# Patient Record
Sex: FEMALE | Race: ASIAN | NOT HISPANIC OR LATINO
[De-identification: names, ages, dates, MRNs, and addresses within clinical notes are randomized per-mention and may not be internally consistent; named-entity substitution may affect disease eponyms.]

---

## 2018-07-24 ENCOUNTER — RESULT REVIEW (OUTPATIENT)
Age: 38
End: 2018-07-24

## 2020-08-12 ENCOUNTER — RESULT REVIEW (OUTPATIENT)
Age: 40
End: 2020-08-12

## 2020-08-14 ENCOUNTER — OUTPATIENT (OUTPATIENT)
Dept: OUTPATIENT SERVICES | Facility: HOSPITAL | Age: 40
LOS: 1 days | End: 2020-08-14
Payer: COMMERCIAL

## 2020-08-14 ENCOUNTER — APPOINTMENT (OUTPATIENT)
Dept: ULTRASOUND IMAGING | Facility: CLINIC | Age: 40
End: 2020-08-14
Payer: COMMERCIAL

## 2020-08-14 ENCOUNTER — TRANSCRIPTION ENCOUNTER (OUTPATIENT)
Age: 40
End: 2020-08-14

## 2020-08-14 ENCOUNTER — APPOINTMENT (OUTPATIENT)
Dept: MAMMOGRAPHY | Facility: CLINIC | Age: 40
End: 2020-08-14
Payer: COMMERCIAL

## 2020-08-14 DIAGNOSIS — R92.2 INCONCLUSIVE MAMMOGRAM: ICD-10-CM

## 2020-08-14 DIAGNOSIS — Z12.31 ENCOUNTER FOR SCREENING MAMMOGRAM FOR MALIGNANT NEOPLASM OF BREAST: ICD-10-CM

## 2020-08-14 PROCEDURE — 76641 ULTRASOUND BREAST COMPLETE: CPT | Mod: 26,50

## 2020-08-14 PROCEDURE — 76641 ULTRASOUND BREAST COMPLETE: CPT

## 2020-08-14 PROCEDURE — G0279: CPT | Mod: 26

## 2020-08-14 PROCEDURE — 77066 DX MAMMO INCL CAD BI: CPT | Mod: 26

## 2020-08-14 PROCEDURE — 77066 DX MAMMO INCL CAD BI: CPT

## 2020-08-14 PROCEDURE — G0279: CPT

## 2020-08-17 ENCOUNTER — LABORATORY RESULT (OUTPATIENT)
Age: 40
End: 2020-08-17

## 2020-08-17 ENCOUNTER — APPOINTMENT (OUTPATIENT)
Dept: SURGICAL ONCOLOGY | Facility: CLINIC | Age: 40
End: 2020-08-17
Payer: COMMERCIAL

## 2020-08-17 VITALS
SYSTOLIC BLOOD PRESSURE: 122 MMHG | HEIGHT: 63 IN | WEIGHT: 163 LBS | OXYGEN SATURATION: 98 % | BODY MASS INDEX: 28.88 KG/M2 | DIASTOLIC BLOOD PRESSURE: 81 MMHG | HEART RATE: 82 BPM

## 2020-08-17 DIAGNOSIS — N63.10 UNSPECIFIED LUMP IN THE RIGHT BREAST, UNSPECIFIED QUADRANT: ICD-10-CM

## 2020-08-17 PROCEDURE — 99205 OFFICE O/P NEW HI 60 MIN: CPT

## 2020-08-17 NOTE — ASSESSMENT
[FreeTextEntry1] : IMP:\par Eczematous inflammation of the right nipple - areolar complex\par \par Plan:\par Await biopsy results

## 2020-08-17 NOTE — PHYSICAL EXAM
[Normal] : supple, no neck mass and thyroid not enlarged [Normal Supraclavicular Lymph Nodes] : normal supraclavicular lymph nodes [Normal Axillary Lymph Nodes] : normal axillary lymph nodes [de-identified] : Mild fibrocystic changes but no masses or nipple discharge; the nipple of the right breast does not have any changes on it but approximately one third of the areola has eczematous inflammation. This was biopsied today [Normal] : oriented to person, place and time, with appropriate affect

## 2020-08-17 NOTE — CONSULT LETTER
[Dear  ___] : Dear  [unfilled], [Consult Letter:] : I had the pleasure of evaluating your patient, [unfilled]. [Consult Closing:] : Thank you very much for allowing me to participate in the care of this patient.  If you have any questions, please do not hesitate to contact me. [Please see my note below.] : Please see my note below. [Sincerely,] : Sincerely, [FreeTextEntry1] : I will keep you informed of the biopsy results. [FreeTextEntry3] : Fidencio Barakat MD FACS\par Chief of Surgical Oncology\par \par

## 2020-08-17 NOTE — HISTORY OF PRESENT ILLNESS
[de-identified] : 38 YO female with right nipple eczematous inflammation which has been present for 3 months.\par She does not have any rash anywhere else on the body. she has never had any breast masses.\par She has no family hx. of breast cancer.\par \par Mammogram and breast sonogram arcos on August 14, 2020 : negative

## 2021-02-10 ENCOUNTER — APPOINTMENT (OUTPATIENT)
Dept: HUMAN REPRODUCTION | Facility: CLINIC | Age: 41
End: 2021-02-10
Payer: COMMERCIAL

## 2021-02-10 PROCEDURE — 99205 OFFICE O/P NEW HI 60 MIN: CPT | Mod: 95

## 2021-04-29 ENCOUNTER — APPOINTMENT (OUTPATIENT)
Dept: INTERVENTIONAL RADIOLOGY/VASCULAR | Facility: CLINIC | Age: 41
End: 2021-04-29
Payer: COMMERCIAL

## 2021-04-29 ENCOUNTER — RESULT REVIEW (OUTPATIENT)
Age: 41
End: 2021-04-29

## 2021-04-29 ENCOUNTER — OUTPATIENT (OUTPATIENT)
Dept: OUTPATIENT SERVICES | Facility: HOSPITAL | Age: 41
LOS: 1 days | End: 2021-04-29

## 2021-04-29 DIAGNOSIS — N97.9 FEMALE INFERTILITY, UNSPECIFIED: ICD-10-CM

## 2021-04-29 PROCEDURE — 74740 X-RAY FEMALE GENITAL TRACT: CPT | Mod: 26

## 2021-04-29 PROCEDURE — 58340 CATHETER FOR HYSTEROGRAPHY: CPT

## 2021-07-12 ENCOUNTER — APPOINTMENT (OUTPATIENT)
Dept: HUMAN REPRODUCTION | Facility: CLINIC | Age: 41
End: 2021-07-12
Payer: COMMERCIAL

## 2021-07-12 PROCEDURE — 99215 OFFICE O/P EST HI 40 MIN: CPT | Mod: 25

## 2021-07-12 PROCEDURE — 99072 ADDL SUPL MATRL&STAF TM PHE: CPT

## 2021-07-12 PROCEDURE — 36415 COLL VENOUS BLD VENIPUNCTURE: CPT

## 2021-07-12 PROCEDURE — 76830 TRANSVAGINAL US NON-OB: CPT

## 2021-07-19 ENCOUNTER — APPOINTMENT (OUTPATIENT)
Dept: HUMAN REPRODUCTION | Facility: CLINIC | Age: 41
End: 2021-07-19
Payer: COMMERCIAL

## 2021-07-19 PROCEDURE — 76830 TRANSVAGINAL US NON-OB: CPT

## 2021-07-19 PROCEDURE — 99072 ADDL SUPL MATRL&STAF TM PHE: CPT

## 2021-07-19 PROCEDURE — 99213 OFFICE O/P EST LOW 20 MIN: CPT | Mod: 25

## 2021-07-19 PROCEDURE — 36415 COLL VENOUS BLD VENIPUNCTURE: CPT

## 2021-07-22 ENCOUNTER — APPOINTMENT (OUTPATIENT)
Dept: HUMAN REPRODUCTION | Facility: CLINIC | Age: 41
End: 2021-07-22
Payer: COMMERCIAL

## 2021-07-22 PROCEDURE — 99213 OFFICE O/P EST LOW 20 MIN: CPT | Mod: 25

## 2021-07-22 PROCEDURE — 99072 ADDL SUPL MATRL&STAF TM PHE: CPT

## 2021-07-22 PROCEDURE — 76830 TRANSVAGINAL US NON-OB: CPT

## 2021-07-22 PROCEDURE — 36415 COLL VENOUS BLD VENIPUNCTURE: CPT

## 2021-08-06 ENCOUNTER — APPOINTMENT (OUTPATIENT)
Dept: HUMAN REPRODUCTION | Facility: CLINIC | Age: 41
End: 2021-08-06
Payer: COMMERCIAL

## 2021-08-06 PROCEDURE — 76830 TRANSVAGINAL US NON-OB: CPT

## 2021-08-06 PROCEDURE — 99213 OFFICE O/P EST LOW 20 MIN: CPT | Mod: 25

## 2021-08-06 PROCEDURE — 36415 COLL VENOUS BLD VENIPUNCTURE: CPT

## 2021-08-18 ENCOUNTER — APPOINTMENT (OUTPATIENT)
Dept: HUMAN REPRODUCTION | Facility: CLINIC | Age: 41
End: 2021-08-18
Payer: COMMERCIAL

## 2021-08-18 PROCEDURE — 76830 TRANSVAGINAL US NON-OB: CPT

## 2021-08-18 PROCEDURE — 99213 OFFICE O/P EST LOW 20 MIN: CPT | Mod: 25

## 2021-08-18 PROCEDURE — 36415 COLL VENOUS BLD VENIPUNCTURE: CPT

## 2021-08-20 ENCOUNTER — APPOINTMENT (OUTPATIENT)
Dept: HUMAN REPRODUCTION | Facility: CLINIC | Age: 41
End: 2021-08-20
Payer: COMMERCIAL

## 2021-08-20 PROCEDURE — 36415 COLL VENOUS BLD VENIPUNCTURE: CPT

## 2021-08-20 PROCEDURE — 99213 OFFICE O/P EST LOW 20 MIN: CPT | Mod: 25

## 2021-08-20 PROCEDURE — 76830 TRANSVAGINAL US NON-OB: CPT

## 2021-09-08 ENCOUNTER — APPOINTMENT (OUTPATIENT)
Dept: HUMAN REPRODUCTION | Facility: CLINIC | Age: 41
End: 2021-09-08

## 2021-09-13 ENCOUNTER — APPOINTMENT (OUTPATIENT)
Dept: HUMAN REPRODUCTION | Facility: CLINIC | Age: 41
End: 2021-09-13
Payer: COMMERCIAL

## 2021-09-13 PROCEDURE — 99213 OFFICE O/P EST LOW 20 MIN: CPT | Mod: 25

## 2021-09-13 PROCEDURE — 36415 COLL VENOUS BLD VENIPUNCTURE: CPT

## 2021-09-13 PROCEDURE — 76830 TRANSVAGINAL US NON-OB: CPT

## 2021-09-20 ENCOUNTER — APPOINTMENT (OUTPATIENT)
Dept: HUMAN REPRODUCTION | Facility: CLINIC | Age: 41
End: 2021-09-20
Payer: COMMERCIAL

## 2021-09-20 PROCEDURE — 99213 OFFICE O/P EST LOW 20 MIN: CPT | Mod: 25

## 2021-09-20 PROCEDURE — 76830 TRANSVAGINAL US NON-OB: CPT

## 2021-09-20 PROCEDURE — 36415 COLL VENOUS BLD VENIPUNCTURE: CPT

## 2021-09-23 ENCOUNTER — APPOINTMENT (OUTPATIENT)
Dept: HUMAN REPRODUCTION | Facility: CLINIC | Age: 41
End: 2021-09-23
Payer: COMMERCIAL

## 2021-09-23 PROCEDURE — 99213 OFFICE O/P EST LOW 20 MIN: CPT | Mod: 25

## 2021-09-23 PROCEDURE — 76830 TRANSVAGINAL US NON-OB: CPT

## 2021-09-23 PROCEDURE — 36415 COLL VENOUS BLD VENIPUNCTURE: CPT

## 2021-09-27 ENCOUNTER — APPOINTMENT (OUTPATIENT)
Dept: HUMAN REPRODUCTION | Facility: CLINIC | Age: 41
End: 2021-09-27
Payer: COMMERCIAL

## 2021-09-27 PROCEDURE — 76830 TRANSVAGINAL US NON-OB: CPT

## 2021-09-27 PROCEDURE — 36415 COLL VENOUS BLD VENIPUNCTURE: CPT

## 2021-09-27 PROCEDURE — 99213 OFFICE O/P EST LOW 20 MIN: CPT | Mod: 25

## 2021-10-11 ENCOUNTER — APPOINTMENT (OUTPATIENT)
Dept: HUMAN REPRODUCTION | Facility: CLINIC | Age: 41
End: 2021-10-11
Payer: COMMERCIAL

## 2021-10-11 PROCEDURE — 36415 COLL VENOUS BLD VENIPUNCTURE: CPT

## 2021-10-11 PROCEDURE — 76830 TRANSVAGINAL US NON-OB: CPT

## 2021-10-11 PROCEDURE — 99213 OFFICE O/P EST LOW 20 MIN: CPT | Mod: 25

## 2021-10-22 ENCOUNTER — APPOINTMENT (OUTPATIENT)
Dept: HUMAN REPRODUCTION | Facility: CLINIC | Age: 41
End: 2021-10-22
Payer: COMMERCIAL

## 2021-10-22 PROCEDURE — 76830 TRANSVAGINAL US NON-OB: CPT

## 2021-10-22 PROCEDURE — 99213 OFFICE O/P EST LOW 20 MIN: CPT | Mod: 25

## 2021-10-22 PROCEDURE — 36415 COLL VENOUS BLD VENIPUNCTURE: CPT

## 2021-11-08 ENCOUNTER — APPOINTMENT (OUTPATIENT)
Dept: HUMAN REPRODUCTION | Facility: CLINIC | Age: 41
End: 2021-11-08
Payer: COMMERCIAL

## 2021-11-08 PROCEDURE — 99213 OFFICE O/P EST LOW 20 MIN: CPT | Mod: 25

## 2021-11-08 PROCEDURE — 76830 TRANSVAGINAL US NON-OB: CPT

## 2021-11-08 PROCEDURE — 36415 COLL VENOUS BLD VENIPUNCTURE: CPT

## 2021-11-17 ENCOUNTER — APPOINTMENT (OUTPATIENT)
Dept: HUMAN REPRODUCTION | Facility: CLINIC | Age: 41
End: 2021-11-17
Payer: COMMERCIAL

## 2021-11-17 PROCEDURE — 76830 TRANSVAGINAL US NON-OB: CPT

## 2021-11-17 PROCEDURE — 99215 OFFICE O/P EST HI 40 MIN: CPT | Mod: 25

## 2021-11-22 ENCOUNTER — APPOINTMENT (OUTPATIENT)
Dept: HUMAN REPRODUCTION | Facility: CLINIC | Age: 41
End: 2021-11-22
Payer: COMMERCIAL

## 2021-11-22 PROCEDURE — 99213 OFFICE O/P EST LOW 20 MIN: CPT | Mod: 25

## 2021-11-22 PROCEDURE — 76830 TRANSVAGINAL US NON-OB: CPT

## 2021-12-06 ENCOUNTER — APPOINTMENT (OUTPATIENT)
Dept: HUMAN REPRODUCTION | Facility: CLINIC | Age: 41
End: 2021-12-06
Payer: COMMERCIAL

## 2021-12-06 ENCOUNTER — APPOINTMENT (OUTPATIENT)
Dept: HUMAN REPRODUCTION | Facility: CLINIC | Age: 41
End: 2021-12-06

## 2021-12-06 PROCEDURE — 36415 COLL VENOUS BLD VENIPUNCTURE: CPT

## 2021-12-06 PROCEDURE — 76830 TRANSVAGINAL US NON-OB: CPT

## 2021-12-06 PROCEDURE — 99213 OFFICE O/P EST LOW 20 MIN: CPT | Mod: 25

## 2021-12-20 ENCOUNTER — APPOINTMENT (OUTPATIENT)
Dept: HUMAN REPRODUCTION | Facility: CLINIC | Age: 41
End: 2021-12-20
Payer: COMMERCIAL

## 2021-12-20 PROCEDURE — 36415 COLL VENOUS BLD VENIPUNCTURE: CPT

## 2021-12-20 PROCEDURE — 99213 OFFICE O/P EST LOW 20 MIN: CPT | Mod: 25

## 2021-12-20 PROCEDURE — 76830 TRANSVAGINAL US NON-OB: CPT

## 2021-12-23 ENCOUNTER — APPOINTMENT (OUTPATIENT)
Dept: HUMAN REPRODUCTION | Facility: CLINIC | Age: 41
End: 2021-12-23
Payer: COMMERCIAL

## 2021-12-23 PROCEDURE — 36415 COLL VENOUS BLD VENIPUNCTURE: CPT

## 2021-12-23 PROCEDURE — 99213 OFFICE O/P EST LOW 20 MIN: CPT | Mod: 25

## 2021-12-23 PROCEDURE — 76830 TRANSVAGINAL US NON-OB: CPT

## 2022-01-06 ENCOUNTER — APPOINTMENT (OUTPATIENT)
Dept: HUMAN REPRODUCTION | Facility: CLINIC | Age: 42
End: 2022-01-06
Payer: COMMERCIAL

## 2022-01-06 PROCEDURE — 76830 TRANSVAGINAL US NON-OB: CPT

## 2022-01-06 PROCEDURE — 36415 COLL VENOUS BLD VENIPUNCTURE: CPT

## 2022-01-06 PROCEDURE — 99213 OFFICE O/P EST LOW 20 MIN: CPT | Mod: 25

## 2022-01-14 ENCOUNTER — APPOINTMENT (OUTPATIENT)
Dept: HUMAN REPRODUCTION | Facility: CLINIC | Age: 42
End: 2022-01-14
Payer: COMMERCIAL

## 2022-01-14 PROCEDURE — 76830 TRANSVAGINAL US NON-OB: CPT

## 2022-01-14 PROCEDURE — 36415 COLL VENOUS BLD VENIPUNCTURE: CPT

## 2022-01-14 PROCEDURE — 99213 OFFICE O/P EST LOW 20 MIN: CPT | Mod: 25

## 2022-01-21 ENCOUNTER — APPOINTMENT (OUTPATIENT)
Dept: HUMAN REPRODUCTION | Facility: CLINIC | Age: 42
End: 2022-01-21
Payer: COMMERCIAL

## 2022-01-21 PROCEDURE — 36415 COLL VENOUS BLD VENIPUNCTURE: CPT

## 2022-01-21 PROCEDURE — 99213 OFFICE O/P EST LOW 20 MIN: CPT | Mod: 25

## 2022-01-21 PROCEDURE — 76830 TRANSVAGINAL US NON-OB: CPT

## 2022-01-24 ENCOUNTER — APPOINTMENT (OUTPATIENT)
Dept: HUMAN REPRODUCTION | Facility: CLINIC | Age: 42
End: 2022-01-24
Payer: COMMERCIAL

## 2022-01-24 PROCEDURE — 76830 TRANSVAGINAL US NON-OB: CPT

## 2022-01-24 PROCEDURE — 99213 OFFICE O/P EST LOW 20 MIN: CPT | Mod: 25

## 2022-01-25 ENCOUNTER — APPOINTMENT (OUTPATIENT)
Dept: HUMAN REPRODUCTION | Facility: CLINIC | Age: 42
End: 2022-01-25
Payer: COMMERCIAL

## 2022-01-25 PROCEDURE — 58322 ARTIFICIAL INSEMINATION: CPT

## 2022-01-25 PROCEDURE — 99213 OFFICE O/P EST LOW 20 MIN: CPT | Mod: 25

## 2022-01-25 PROCEDURE — 89261 SPERM ISOLATION COMPLEX: CPT

## 2022-02-08 ENCOUNTER — APPOINTMENT (OUTPATIENT)
Dept: HUMAN REPRODUCTION | Facility: CLINIC | Age: 42
End: 2022-02-08
Payer: COMMERCIAL

## 2022-02-08 PROCEDURE — 76830 TRANSVAGINAL US NON-OB: CPT

## 2022-02-08 PROCEDURE — 36415 COLL VENOUS BLD VENIPUNCTURE: CPT

## 2022-02-08 PROCEDURE — 99213 OFFICE O/P EST LOW 20 MIN: CPT | Mod: 25

## 2022-02-10 ENCOUNTER — APPOINTMENT (OUTPATIENT)
Dept: HUMAN REPRODUCTION | Facility: CLINIC | Age: 42
End: 2022-02-10

## 2022-02-16 ENCOUNTER — APPOINTMENT (OUTPATIENT)
Dept: HUMAN REPRODUCTION | Facility: CLINIC | Age: 42
End: 2022-02-16
Payer: COMMERCIAL

## 2022-02-16 PROCEDURE — 99213Y: CUSTOM | Mod: 25

## 2022-02-16 PROCEDURE — 76817 TRANSVAGINAL US OBSTETRIC: CPT

## 2022-02-16 PROCEDURE — 36415 COLL VENOUS BLD VENIPUNCTURE: CPT

## 2022-02-23 ENCOUNTER — APPOINTMENT (OUTPATIENT)
Dept: HUMAN REPRODUCTION | Facility: CLINIC | Age: 42
End: 2022-02-23
Payer: COMMERCIAL

## 2022-02-23 PROCEDURE — 36415 COLL VENOUS BLD VENIPUNCTURE: CPT

## 2022-02-23 PROCEDURE — 76830 TRANSVAGINAL US NON-OB: CPT

## 2022-02-23 PROCEDURE — 99213Y: CUSTOM | Mod: 25

## 2022-03-02 ENCOUNTER — APPOINTMENT (OUTPATIENT)
Dept: HUMAN REPRODUCTION | Facility: CLINIC | Age: 42
End: 2022-03-02
Payer: COMMERCIAL

## 2022-03-02 PROCEDURE — 99213 OFFICE O/P EST LOW 20 MIN: CPT | Mod: 25

## 2022-03-02 PROCEDURE — 36415 COLL VENOUS BLD VENIPUNCTURE: CPT

## 2022-03-02 PROCEDURE — 76817 TRANSVAGINAL US OBSTETRIC: CPT

## 2022-03-09 ENCOUNTER — APPOINTMENT (OUTPATIENT)
Dept: HUMAN REPRODUCTION | Facility: CLINIC | Age: 42
End: 2022-03-09
Payer: COMMERCIAL

## 2022-03-09 PROCEDURE — 99213 OFFICE O/P EST LOW 20 MIN: CPT | Mod: 25

## 2022-03-09 PROCEDURE — 36415 COLL VENOUS BLD VENIPUNCTURE: CPT

## 2022-03-09 PROCEDURE — 76817 TRANSVAGINAL US OBSTETRIC: CPT

## 2022-03-14 ENCOUNTER — APPOINTMENT (OUTPATIENT)
Dept: HUMAN REPRODUCTION | Facility: CLINIC | Age: 42
End: 2022-03-14
Payer: COMMERCIAL

## 2022-03-14 PROCEDURE — 36415 COLL VENOUS BLD VENIPUNCTURE: CPT

## 2022-03-16 ENCOUNTER — APPOINTMENT (OUTPATIENT)
Dept: HUMAN REPRODUCTION | Facility: CLINIC | Age: 42
End: 2022-03-16
Payer: COMMERCIAL

## 2022-03-16 ENCOUNTER — RESULT REVIEW (OUTPATIENT)
Age: 42
End: 2022-03-16

## 2022-03-16 PROCEDURE — 76998 US GUIDE INTRAOP: CPT

## 2022-03-16 PROCEDURE — 59820 CARE OF MISCARRIAGE: CPT

## 2022-03-30 ENCOUNTER — APPOINTMENT (OUTPATIENT)
Dept: HUMAN REPRODUCTION | Facility: CLINIC | Age: 42
End: 2022-03-30
Payer: COMMERCIAL

## 2022-03-30 PROCEDURE — 99213 OFFICE O/P EST LOW 20 MIN: CPT | Mod: 25

## 2022-03-30 PROCEDURE — 36415 COLL VENOUS BLD VENIPUNCTURE: CPT

## 2022-03-30 PROCEDURE — 99024 POSTOP FOLLOW-UP VISIT: CPT

## 2022-03-30 PROCEDURE — 99204 OFFICE O/P NEW MOD 45 MIN: CPT | Mod: 25

## 2022-03-30 PROCEDURE — 99214 OFFICE O/P EST MOD 30 MIN: CPT | Mod: 25

## 2022-03-30 PROCEDURE — 76830 TRANSVAGINAL US NON-OB: CPT

## 2022-04-13 ENCOUNTER — APPOINTMENT (OUTPATIENT)
Dept: HUMAN REPRODUCTION | Facility: CLINIC | Age: 42
End: 2022-04-13
Payer: COMMERCIAL

## 2022-04-13 PROCEDURE — 36415 COLL VENOUS BLD VENIPUNCTURE: CPT

## 2022-04-20 ENCOUNTER — APPOINTMENT (OUTPATIENT)
Dept: HUMAN REPRODUCTION | Facility: CLINIC | Age: 42
End: 2022-04-20
Payer: COMMERCIAL

## 2022-04-20 PROCEDURE — 36415 COLL VENOUS BLD VENIPUNCTURE: CPT

## 2022-04-28 ENCOUNTER — APPOINTMENT (OUTPATIENT)
Dept: HUMAN REPRODUCTION | Facility: CLINIC | Age: 42
End: 2022-04-28
Payer: COMMERCIAL

## 2022-04-28 PROCEDURE — 36415 COLL VENOUS BLD VENIPUNCTURE: CPT

## 2022-05-05 ENCOUNTER — APPOINTMENT (OUTPATIENT)
Dept: HUMAN REPRODUCTION | Facility: CLINIC | Age: 42
End: 2022-05-05
Payer: COMMERCIAL

## 2022-05-05 PROCEDURE — 36415 COLL VENOUS BLD VENIPUNCTURE: CPT

## 2022-05-05 PROCEDURE — 76830 TRANSVAGINAL US NON-OB: CPT

## 2022-05-05 PROCEDURE — 99213Y: CUSTOM | Mod: 25,24

## 2022-06-01 ENCOUNTER — APPOINTMENT (OUTPATIENT)
Dept: HUMAN REPRODUCTION | Facility: CLINIC | Age: 42
End: 2022-06-01
Payer: COMMERCIAL

## 2022-06-01 PROCEDURE — 76830 TRANSVAGINAL US NON-OB: CPT

## 2022-06-01 PROCEDURE — 99213Y: CUSTOM | Mod: 25,24

## 2022-06-01 PROCEDURE — 36415 COLL VENOUS BLD VENIPUNCTURE: CPT

## 2022-06-08 ENCOUNTER — APPOINTMENT (OUTPATIENT)
Dept: HUMAN REPRODUCTION | Facility: CLINIC | Age: 42
End: 2022-06-08
Payer: COMMERCIAL

## 2022-06-08 PROCEDURE — 36415 COLL VENOUS BLD VENIPUNCTURE: CPT

## 2022-06-08 PROCEDURE — 76830 TRANSVAGINAL US NON-OB: CPT

## 2022-06-08 PROCEDURE — 99213Y: CUSTOM | Mod: 25,24

## 2022-06-11 ENCOUNTER — APPOINTMENT (OUTPATIENT)
Dept: HUMAN REPRODUCTION | Facility: CLINIC | Age: 42
End: 2022-06-11
Payer: COMMERCIAL

## 2022-06-11 PROCEDURE — 89260 SPERM ISOLATION SIMPLE: CPT

## 2022-06-11 PROCEDURE — 58322 ARTIFICIAL INSEMINATION: CPT | Mod: 58

## 2022-06-11 PROCEDURE — 99213 OFFICE O/P EST LOW 20 MIN: CPT | Mod: 25

## 2022-06-27 ENCOUNTER — APPOINTMENT (OUTPATIENT)
Dept: HUMAN REPRODUCTION | Facility: CLINIC | Age: 42
End: 2022-06-27

## 2022-06-27 PROCEDURE — 36415 COLL VENOUS BLD VENIPUNCTURE: CPT

## 2022-06-27 PROCEDURE — 76830 TRANSVAGINAL US NON-OB: CPT

## 2022-06-27 PROCEDURE — 99213Y: CUSTOM | Mod: 25

## 2022-07-07 ENCOUNTER — APPOINTMENT (OUTPATIENT)
Dept: HUMAN REPRODUCTION | Facility: CLINIC | Age: 42
End: 2022-07-07

## 2022-07-07 PROCEDURE — 36415 COLL VENOUS BLD VENIPUNCTURE: CPT

## 2022-07-07 PROCEDURE — 76830 TRANSVAGINAL US NON-OB: CPT

## 2022-07-07 PROCEDURE — 99213 OFFICE O/P EST LOW 20 MIN: CPT | Mod: 25

## 2022-07-09 ENCOUNTER — APPOINTMENT (OUTPATIENT)
Dept: HUMAN REPRODUCTION | Facility: CLINIC | Age: 42
End: 2022-07-09

## 2022-07-09 PROCEDURE — 99213 OFFICE O/P EST LOW 20 MIN: CPT | Mod: 25

## 2022-07-09 PROCEDURE — 58322 ARTIFICIAL INSEMINATION: CPT

## 2022-07-09 PROCEDURE — 89260 SPERM ISOLATION SIMPLE: CPT

## 2022-07-22 ENCOUNTER — APPOINTMENT (OUTPATIENT)
Dept: HUMAN REPRODUCTION | Facility: CLINIC | Age: 42
End: 2022-07-22

## 2022-07-22 PROCEDURE — 76830 TRANSVAGINAL US NON-OB: CPT

## 2022-07-22 PROCEDURE — 99213Y: CUSTOM | Mod: 25

## 2022-07-22 PROCEDURE — 36415 COLL VENOUS BLD VENIPUNCTURE: CPT

## 2022-08-01 ENCOUNTER — APPOINTMENT (OUTPATIENT)
Dept: HUMAN REPRODUCTION | Facility: CLINIC | Age: 42
End: 2022-08-01

## 2022-08-01 PROCEDURE — 76830 TRANSVAGINAL US NON-OB: CPT

## 2022-08-01 PROCEDURE — 36415 COLL VENOUS BLD VENIPUNCTURE: CPT

## 2022-08-01 PROCEDURE — 99213Y: CUSTOM | Mod: 25

## 2022-08-05 ENCOUNTER — APPOINTMENT (OUTPATIENT)
Dept: HUMAN REPRODUCTION | Facility: CLINIC | Age: 42
End: 2022-08-05

## 2022-08-05 PROCEDURE — 99213 OFFICE O/P EST LOW 20 MIN: CPT | Mod: 25

## 2022-08-05 PROCEDURE — 76830 TRANSVAGINAL US NON-OB: CPT

## 2022-08-06 ENCOUNTER — APPOINTMENT (OUTPATIENT)
Dept: HUMAN REPRODUCTION | Facility: CLINIC | Age: 42
End: 2022-08-06

## 2022-08-06 PROCEDURE — 99213 OFFICE O/P EST LOW 20 MIN: CPT | Mod: 25

## 2022-08-06 PROCEDURE — 89261 SPERM ISOLATION COMPLEX: CPT

## 2022-08-06 PROCEDURE — 58322 ARTIFICIAL INSEMINATION: CPT

## 2022-08-19 ENCOUNTER — APPOINTMENT (OUTPATIENT)
Dept: HUMAN REPRODUCTION | Facility: CLINIC | Age: 42
End: 2022-08-19

## 2022-08-19 PROCEDURE — 76830 TRANSVAGINAL US NON-OB: CPT

## 2022-08-19 PROCEDURE — 36415 COLL VENOUS BLD VENIPUNCTURE: CPT

## 2022-08-19 PROCEDURE — 99213Y: CUSTOM | Mod: 25

## 2022-08-25 ENCOUNTER — APPOINTMENT (OUTPATIENT)
Dept: HUMAN REPRODUCTION | Facility: CLINIC | Age: 42
End: 2022-08-25

## 2022-08-25 PROCEDURE — 99213Y: CUSTOM | Mod: 25

## 2022-08-25 PROCEDURE — 36415 COLL VENOUS BLD VENIPUNCTURE: CPT

## 2022-08-25 PROCEDURE — 76830 TRANSVAGINAL US NON-OB: CPT

## 2022-08-31 ENCOUNTER — APPOINTMENT (OUTPATIENT)
Dept: HUMAN REPRODUCTION | Facility: CLINIC | Age: 42
End: 2022-08-31

## 2022-08-31 PROCEDURE — 99213Y: CUSTOM | Mod: 25

## 2022-08-31 PROCEDURE — 36415 COLL VENOUS BLD VENIPUNCTURE: CPT

## 2022-08-31 PROCEDURE — 76830 TRANSVAGINAL US NON-OB: CPT

## 2022-09-13 ENCOUNTER — APPOINTMENT (OUTPATIENT)
Dept: HUMAN REPRODUCTION | Facility: CLINIC | Age: 42
End: 2022-09-13

## 2022-09-13 PROCEDURE — 99215 OFFICE O/P EST HI 40 MIN: CPT | Mod: 95

## 2022-09-16 ENCOUNTER — APPOINTMENT (OUTPATIENT)
Dept: ORTHOPEDIC SURGERY | Facility: CLINIC | Age: 42
End: 2022-09-16

## 2022-09-16 VITALS — BODY MASS INDEX: 28.88 KG/M2 | WEIGHT: 163 LBS | HEIGHT: 63 IN

## 2022-09-16 PROCEDURE — 73590 X-RAY EXAM OF LOWER LEG: CPT | Mod: RT

## 2022-09-16 PROCEDURE — 99203 OFFICE O/P NEW LOW 30 MIN: CPT

## 2022-09-16 NOTE — ASSESSMENT
[FreeTextEntry1] : 43 y/o F with calf pain s/p volleyball injury\par \par -MRI Lower extremity r/o gastroc tear\par -Nonweightbearing\par -Follow up with sports medicine after MRI

## 2022-09-16 NOTE — HISTORY OF PRESENT ILLNESS
[9] : 9 [5] : 5 [Sharp] : sharp [de-identified] : 43 y/o F presenting with R calf pain s/p volleyball injury. States was playing volleyball and felt a tightness then a popping sensation in the right calf. Has pain and difficulty putting weight on the extremity.  [FreeTextEntry5] : pt states she was playing volleyball yestreday, pt c.o pain in rt calf

## 2022-09-18 ENCOUNTER — FORM ENCOUNTER (OUTPATIENT)
Age: 42
End: 2022-09-18

## 2022-09-19 ENCOUNTER — APPOINTMENT (OUTPATIENT)
Dept: MRI IMAGING | Facility: CLINIC | Age: 42
End: 2022-09-19

## 2022-09-19 ENCOUNTER — APPOINTMENT (OUTPATIENT)
Dept: HUMAN REPRODUCTION | Facility: CLINIC | Age: 42
End: 2022-09-19

## 2022-09-19 PROCEDURE — 99213Y: CUSTOM | Mod: 25

## 2022-09-19 PROCEDURE — 76830 TRANSVAGINAL US NON-OB: CPT

## 2022-09-19 PROCEDURE — 36415 COLL VENOUS BLD VENIPUNCTURE: CPT

## 2022-09-19 PROCEDURE — 73718 MRI LOWER EXTREMITY W/O DYE: CPT | Mod: RT

## 2022-09-21 ENCOUNTER — APPOINTMENT (OUTPATIENT)
Dept: ORTHOPEDIC SURGERY | Facility: CLINIC | Age: 42
End: 2022-09-21
Payer: COMMERCIAL

## 2022-09-21 VITALS — BODY MASS INDEX: 28.88 KG/M2 | HEIGHT: 63 IN | WEIGHT: 163 LBS

## 2022-09-21 DIAGNOSIS — Z78.9 OTHER SPECIFIED HEALTH STATUS: ICD-10-CM

## 2022-09-21 PROCEDURE — 99203 OFFICE O/P NEW LOW 30 MIN: CPT

## 2022-09-21 PROCEDURE — 99204 OFFICE O/P NEW MOD 45 MIN: CPT

## 2022-09-21 NOTE — DISCUSSION/SUMMARY
[de-identified] : 43 y/o F with right medial gastroc muscle strain \par \par 1) PT \par 2) cryotherapy, rest and activity modification\par 3) heel lifts for shoes \par 4) hep \par 5) rtc in 6 weeks \par \par Entered by Kiara Mena acting as scribe.\par

## 2022-09-21 NOTE — PHYSICAL EXAM
[Right] : right foot and ankle [] : negative Block test [FreeTextEntry8] : TTP medial gastroc \par calf is soft and compressible \par

## 2022-09-21 NOTE — DATA REVIEWED
[MRI] : MRI [Right] : of the right [Report was reviewed and noted in the chart] : The report was reviewed and noted in the chart [I independently reviewed and interpreted images and report] : I independently reviewed and interpreted images and report [I reviewed the films/CD] : I reviewed the films/CD [FreeTextEntry1] : 9/19/22\par \par 1. Findings consistent with recent traumatic injury with moderate diffuse medial gastrocnemius muscle strain, mild diffuse lateral gastrocnemius muscle strain and moderate subcutaneous edema and swelling throughout the \par medial and posterior aspects of the calf without acute fracture or malalignment of the right tibia and fibula.\par 2. Clinical correlation and follow up is recommended.\par 3. No evidence of Achilles tendon discontinuity.

## 2022-09-28 ENCOUNTER — APPOINTMENT (OUTPATIENT)
Dept: ORTHOPEDIC SURGERY | Facility: CLINIC | Age: 42
End: 2022-09-28

## 2022-10-04 ENCOUNTER — APPOINTMENT (OUTPATIENT)
Dept: ULTRASOUND IMAGING | Facility: CLINIC | Age: 42
End: 2022-10-04

## 2022-10-04 ENCOUNTER — APPOINTMENT (OUTPATIENT)
Dept: MAMMOGRAPHY | Facility: CLINIC | Age: 42
End: 2022-10-04

## 2022-10-04 ENCOUNTER — OUTPATIENT (OUTPATIENT)
Dept: OUTPATIENT SERVICES | Facility: HOSPITAL | Age: 42
LOS: 1 days | End: 2022-10-04
Payer: COMMERCIAL

## 2022-10-04 DIAGNOSIS — Z00.8 ENCOUNTER FOR OTHER GENERAL EXAMINATION: ICD-10-CM

## 2022-10-04 PROCEDURE — 77063 BREAST TOMOSYNTHESIS BI: CPT | Mod: 26

## 2022-10-04 PROCEDURE — 77067 SCR MAMMO BI INCL CAD: CPT | Mod: 26

## 2022-10-04 PROCEDURE — 76641 ULTRASOUND BREAST COMPLETE: CPT | Mod: 26,50

## 2022-10-04 PROCEDURE — 77067 SCR MAMMO BI INCL CAD: CPT

## 2022-10-04 PROCEDURE — 77063 BREAST TOMOSYNTHESIS BI: CPT

## 2022-10-04 PROCEDURE — 76641 ULTRASOUND BREAST COMPLETE: CPT

## 2022-10-05 ENCOUNTER — APPOINTMENT (OUTPATIENT)
Dept: HUMAN REPRODUCTION | Facility: CLINIC | Age: 42
End: 2022-10-05

## 2022-10-05 PROCEDURE — 99213 OFFICE O/P EST LOW 20 MIN: CPT | Mod: 25

## 2022-10-05 PROCEDURE — 36415 COLL VENOUS BLD VENIPUNCTURE: CPT

## 2022-10-05 PROCEDURE — 76830 TRANSVAGINAL US NON-OB: CPT

## 2022-10-06 ENCOUNTER — APPOINTMENT (OUTPATIENT)
Dept: ORTHOPEDIC SURGERY | Facility: CLINIC | Age: 42
End: 2022-10-06

## 2022-10-06 DIAGNOSIS — S86.119A STRAIN OF OTHER MUSCLE(S) AND TENDON(S) OF POSTERIOR MUSCLE GROUP AT LOWER LEG LEVEL, UNSPECIFIED LEG, INITIAL ENCOUNTER: ICD-10-CM

## 2022-10-06 PROCEDURE — 99213 OFFICE O/P EST LOW 20 MIN: CPT

## 2022-10-06 NOTE — HISTORY OF PRESENT ILLNESS
[Right Leg] : right leg [1] : 2 [0] : 0 [de-identified] : 10/6/22: Here to f/up right calf strain. DOing well, minimal remaining pain, has been able to resume her activity \par 9/21/22: 42F who presents today with right lower leg pain. patient was playing volleyball when she felt tightness in her gastroc followed by a pop. pain is proximal over the medial gastroc. ambulates w/ a cane. Saw [facility] pa-c and received XR and MRI. No prior injuries to involved leg.  [de-identified] : PT

## 2022-10-06 NOTE — DISCUSSION/SUMMARY
[de-identified] : 43 y/o F with right medial gastroc muscle strain, resolved\par 1) activity as tolerated\par 2) cryotherapy, rest and activity modification\par 3) rtc prn\par \par Entered by Kiara Mena acting as scribe.\par

## 2022-10-06 NOTE — PHYSICAL EXAM
[Right] : right foot and ankle [] : patient ambulates without assistive device [FreeTextEntry8] : TTP medial gastroc \par calf is soft and compressible \par

## 2022-10-11 ENCOUNTER — APPOINTMENT (OUTPATIENT)
Dept: HUMAN REPRODUCTION | Facility: CLINIC | Age: 42
End: 2022-10-11

## 2022-10-11 PROCEDURE — 36415 COLL VENOUS BLD VENIPUNCTURE: CPT

## 2022-10-11 PROCEDURE — 76830 TRANSVAGINAL US NON-OB: CPT

## 2022-10-11 PROCEDURE — 99213Y: CUSTOM | Mod: 25

## 2022-10-13 ENCOUNTER — APPOINTMENT (OUTPATIENT)
Dept: HUMAN REPRODUCTION | Facility: CLINIC | Age: 42
End: 2022-10-13

## 2022-10-13 PROCEDURE — 36415 COLL VENOUS BLD VENIPUNCTURE: CPT

## 2022-10-13 PROCEDURE — 76830 TRANSVAGINAL US NON-OB: CPT

## 2022-10-13 PROCEDURE — 99213Y: CUSTOM | Mod: 25

## 2022-10-15 ENCOUNTER — APPOINTMENT (OUTPATIENT)
Dept: HUMAN REPRODUCTION | Facility: CLINIC | Age: 42
End: 2022-10-15

## 2022-10-15 PROCEDURE — 99213 OFFICE O/P EST LOW 20 MIN: CPT | Mod: 25

## 2022-10-15 PROCEDURE — 36415 COLL VENOUS BLD VENIPUNCTURE: CPT

## 2022-10-15 PROCEDURE — 76830 TRANSVAGINAL US NON-OB: CPT

## 2022-10-16 ENCOUNTER — APPOINTMENT (OUTPATIENT)
Dept: HUMAN REPRODUCTION | Facility: CLINIC | Age: 42
End: 2022-10-16

## 2022-10-16 PROCEDURE — 99213 OFFICE O/P EST LOW 20 MIN: CPT | Mod: 25

## 2022-10-16 PROCEDURE — 76830 TRANSVAGINAL US NON-OB: CPT

## 2022-10-16 PROCEDURE — 36415 COLL VENOUS BLD VENIPUNCTURE: CPT

## 2022-10-17 ENCOUNTER — APPOINTMENT (OUTPATIENT)
Dept: HUMAN REPRODUCTION | Facility: CLINIC | Age: 42
End: 2022-10-17

## 2022-10-17 PROCEDURE — 99213Y: CUSTOM | Mod: 25

## 2022-10-17 PROCEDURE — 36415 COLL VENOUS BLD VENIPUNCTURE: CPT

## 2022-10-17 PROCEDURE — 76830 TRANSVAGINAL US NON-OB: CPT

## 2022-10-18 ENCOUNTER — APPOINTMENT (OUTPATIENT)
Dept: HUMAN REPRODUCTION | Facility: CLINIC | Age: 42
End: 2022-10-18

## 2022-10-18 PROCEDURE — 36415 COLL VENOUS BLD VENIPUNCTURE: CPT

## 2022-10-19 ENCOUNTER — APPOINTMENT (OUTPATIENT)
Dept: HUMAN REPRODUCTION | Facility: CLINIC | Age: 42
End: 2022-10-19

## 2022-10-19 PROCEDURE — 89261 SPERM ISOLATION COMPLEX: CPT

## 2022-10-19 PROCEDURE — 76948 ECHO GUIDE OVA ASPIRATION: CPT

## 2022-10-19 PROCEDURE — 58970 RETRIEVAL OF OOCYTE: CPT

## 2022-10-19 PROCEDURE — 89254 OOCYTE IDENTIFICATION: CPT

## 2022-10-19 PROCEDURE — 89280 ASSIST OOCYTE FERTILIZATION: CPT

## 2022-10-19 PROCEDURE — 89250 CULTR OOCYTE/EMBRYO <4 DAYS: CPT

## 2022-10-22 PROCEDURE — 89253 EMBRYO HATCHING: CPT

## 2022-10-24 PROCEDURE — 89272 EXTENDED CULTURE OF OOCYTES: CPT

## 2022-10-25 PROCEDURE — 89342 STORAGE/YEAR EMBRYO(S): CPT

## 2022-10-25 PROCEDURE — 89258 CRYOPRESERVATION EMBRYO(S): CPT

## 2022-10-25 PROCEDURE — 89290 BIOPSY OOCYTE POLAR BODY <=5: CPT

## 2022-11-07 ENCOUNTER — APPOINTMENT (OUTPATIENT)
Dept: HUMAN REPRODUCTION | Facility: CLINIC | Age: 42
End: 2022-11-07

## 2022-11-07 PROCEDURE — 36415 COLL VENOUS BLD VENIPUNCTURE: CPT

## 2022-11-09 ENCOUNTER — APPOINTMENT (OUTPATIENT)
Dept: HUMAN REPRODUCTION | Facility: CLINIC | Age: 42
End: 2022-11-09

## 2022-11-09 PROCEDURE — 76831 ECHO EXAM UTERUS: CPT

## 2022-11-09 PROCEDURE — 58340 CATHETER FOR HYSTEROGRAPHY: CPT

## 2022-11-09 PROCEDURE — 58999I: CUSTOM

## 2022-11-09 PROCEDURE — 99213 OFFICE O/P EST LOW 20 MIN: CPT | Mod: 25

## 2023-01-18 DIAGNOSIS — Z78.9 OTHER SPECIFIED HEALTH STATUS: ICD-10-CM

## 2023-01-18 RX ORDER — PROGESTERONE 200 MG/1
200 CAPSULE ORAL DAILY
Refills: 0 | Status: ACTIVE | COMMUNITY

## 2023-01-18 RX ORDER — PRENATAL VIT 49/IRON FUM/FOLIC 6.75-0.2MG
TABLET ORAL DAILY
Refills: 0 | Status: ACTIVE | COMMUNITY

## 2023-01-19 ENCOUNTER — APPOINTMENT (OUTPATIENT)
Dept: CARDIOLOGY | Facility: CLINIC | Age: 43
End: 2023-01-19
Payer: COMMERCIAL

## 2023-01-19 ENCOUNTER — NON-APPOINTMENT (OUTPATIENT)
Age: 43
End: 2023-01-19

## 2023-01-19 VITALS
WEIGHT: 178 LBS | HEIGHT: 63 IN | BODY MASS INDEX: 31.54 KG/M2 | DIASTOLIC BLOOD PRESSURE: 83 MMHG | HEART RATE: 82 BPM | SYSTOLIC BLOOD PRESSURE: 144 MMHG | OXYGEN SATURATION: 96 %

## 2023-01-19 VITALS — SYSTOLIC BLOOD PRESSURE: 128 MMHG | DIASTOLIC BLOOD PRESSURE: 75 MMHG

## 2023-01-19 DIAGNOSIS — Z13.6 ENCOUNTER FOR SCREENING FOR CARDIOVASCULAR DISORDERS: ICD-10-CM

## 2023-01-19 DIAGNOSIS — R03.0 ELEVATED BLOOD-PRESSURE READING, W/OUT DIAGNOSIS OF HYPERTENSION: ICD-10-CM

## 2023-01-19 DIAGNOSIS — I10 ESSENTIAL (PRIMARY) HYPERTENSION: ICD-10-CM

## 2023-01-19 DIAGNOSIS — I49.9 CARDIAC ARRHYTHMIA, UNSPECIFIED: ICD-10-CM

## 2023-01-19 DIAGNOSIS — R06.02 SHORTNESS OF BREATH: ICD-10-CM

## 2023-01-19 PROCEDURE — 93000 ELECTROCARDIOGRAM COMPLETE: CPT

## 2023-01-19 PROCEDURE — 99204 OFFICE O/P NEW MOD 45 MIN: CPT | Mod: 25

## 2023-01-22 PROBLEM — I49.9 IRREGULAR HEARTBEAT: Status: ACTIVE | Noted: 2023-01-18

## 2023-01-22 PROBLEM — I10 HYPERTENSION: Status: ACTIVE | Noted: 2023-01-18

## 2023-01-22 PROBLEM — R06.02 SHORTNESS OF BREATH: Status: ACTIVE | Noted: 2023-01-18

## 2023-01-22 PROBLEM — Z13.6 SCREENING FOR CARDIOVASCULAR CONDITION: Status: ACTIVE | Noted: 2023-01-22

## 2023-01-22 PROBLEM — R03.0 ELEVATED BLOOD PRESSURE READING WITHOUT DIAGNOSIS OF HYPERTENSION: Status: ACTIVE | Noted: 2023-01-22

## 2023-02-03 ENCOUNTER — OUTPATIENT (OUTPATIENT)
Dept: OUTPATIENT SERVICES | Facility: HOSPITAL | Age: 43
LOS: 1 days | End: 2023-02-03
Payer: COMMERCIAL

## 2023-02-03 ENCOUNTER — APPOINTMENT (OUTPATIENT)
Dept: CV DIAGNOSITCS | Facility: HOSPITAL | Age: 43
End: 2023-02-03

## 2023-02-03 DIAGNOSIS — I10 ESSENTIAL (PRIMARY) HYPERTENSION: ICD-10-CM

## 2023-02-03 DIAGNOSIS — I49.9 CARDIAC ARRHYTHMIA, UNSPECIFIED: ICD-10-CM

## 2023-02-03 DIAGNOSIS — R06.02 SHORTNESS OF BREATH: ICD-10-CM

## 2023-02-03 PROCEDURE — 93306 TTE W/DOPPLER COMPLETE: CPT | Mod: 26

## 2023-03-29 ENCOUNTER — EMERGENCY (EMERGENCY)
Facility: HOSPITAL | Age: 43
LOS: 1 days | Discharge: ANOTHER HOSPITAL | End: 2023-03-29
Admitting: EMERGENCY MEDICINE
Payer: COMMERCIAL

## 2023-03-29 ENCOUNTER — INPATIENT (INPATIENT)
Facility: HOSPITAL | Age: 43
LOS: 0 days | Discharge: HOME | End: 2023-03-30
Attending: OBSTETRICS & GYNECOLOGY | Admitting: OBSTETRICS & GYNECOLOGY
Payer: COMMERCIAL

## 2023-03-29 VITALS — HEART RATE: 84 BPM | OXYGEN SATURATION: 97 % | DIASTOLIC BLOOD PRESSURE: 69 MMHG | SYSTOLIC BLOOD PRESSURE: 140 MMHG

## 2023-03-29 VITALS
RESPIRATION RATE: 18 BRPM | SYSTOLIC BLOOD PRESSURE: 151 MMHG | OXYGEN SATURATION: 97 % | TEMPERATURE: 98 F | DIASTOLIC BLOOD PRESSURE: 90 MMHG | HEART RATE: 102 BPM | HEIGHT: 62 IN | WEIGHT: 175.05 LBS

## 2023-03-29 DIAGNOSIS — O26.899 OTHER SPECIFIED PREGNANCY RELATED CONDITIONS, UNSPECIFIED TRIMESTER: ICD-10-CM

## 2023-03-29 DIAGNOSIS — Z34.90 ENCOUNTER FOR SUPERVISION OF NORMAL PREGNANCY, UNSPECIFIED, UNSPECIFIED TRIMESTER: ICD-10-CM

## 2023-03-29 LAB
ALBUMIN SERPL ELPH-MCNC: 3.6 G/DL — SIGNIFICANT CHANGE UP (ref 3.3–5)
ALP SERPL-CCNC: 46 U/L — SIGNIFICANT CHANGE UP (ref 40–120)
ALT FLD-CCNC: 10 U/L — SIGNIFICANT CHANGE UP (ref 10–45)
ANION GAP SERPL CALC-SCNC: 12 MMOL/L — SIGNIFICANT CHANGE UP (ref 5–17)
APTT BLD: 28.7 SEC — SIGNIFICANT CHANGE UP (ref 27.5–35.5)
AST SERPL-CCNC: 11 U/L — SIGNIFICANT CHANGE UP (ref 10–40)
BASOPHILS # BLD AUTO: 0.04 K/UL — SIGNIFICANT CHANGE UP (ref 0–0.2)
BASOPHILS NFR BLD AUTO: 0.3 % — SIGNIFICANT CHANGE UP (ref 0–2)
BILIRUB SERPL-MCNC: 0.1 MG/DL — LOW (ref 0.2–1.2)
BLD GP AB SCN SERPL QL: NEGATIVE — SIGNIFICANT CHANGE UP
BUN SERPL-MCNC: 6 MG/DL — LOW (ref 7–23)
CALCIUM SERPL-MCNC: 8.6 MG/DL — SIGNIFICANT CHANGE UP (ref 8.4–10.5)
CHLORIDE SERPL-SCNC: 107 MMOL/L — SIGNIFICANT CHANGE UP (ref 96–108)
CO2 SERPL-SCNC: 20 MMOL/L — LOW (ref 22–31)
CREAT SERPL-MCNC: 0.35 MG/DL — LOW (ref 0.5–1.3)
EGFR: 132 ML/MIN/1.73M2 — SIGNIFICANT CHANGE UP
EOSINOPHIL # BLD AUTO: 0.09 K/UL — SIGNIFICANT CHANGE UP (ref 0–0.5)
EOSINOPHIL NFR BLD AUTO: 0.6 % — SIGNIFICANT CHANGE UP (ref 0–6)
FIBRINOGEN PPP-MCNC: 535 MG/DL — HIGH (ref 200–445)
GLUCOSE BLDC GLUCOMTR-MCNC: 112 MG/DL — HIGH (ref 70–99)
GLUCOSE SERPL-MCNC: 91 MG/DL — SIGNIFICANT CHANGE UP (ref 70–99)
HCT VFR BLD CALC: 38.4 % — SIGNIFICANT CHANGE UP (ref 34.5–45)
HGB BLD-MCNC: 12.6 G/DL — SIGNIFICANT CHANGE UP (ref 11.5–15.5)
IMM GRANULOCYTES NFR BLD AUTO: 1 % — HIGH (ref 0–0.9)
INR BLD: 0.98 RATIO — SIGNIFICANT CHANGE UP (ref 0.88–1.16)
LYMPHOCYTES # BLD AUTO: 1.85 K/UL — SIGNIFICANT CHANGE UP (ref 1–3.3)
LYMPHOCYTES # BLD AUTO: 12.2 % — LOW (ref 13–44)
MCHC RBC-ENTMCNC: 27.9 PG — SIGNIFICANT CHANGE UP (ref 27–34)
MCHC RBC-ENTMCNC: 32.8 GM/DL — SIGNIFICANT CHANGE UP (ref 32–36)
MCV RBC AUTO: 85 FL — SIGNIFICANT CHANGE UP (ref 80–100)
MONOCYTES # BLD AUTO: 0.64 K/UL — SIGNIFICANT CHANGE UP (ref 0–0.9)
MONOCYTES NFR BLD AUTO: 4.2 % — SIGNIFICANT CHANGE UP (ref 2–14)
NEUTROPHILS # BLD AUTO: 12.44 K/UL — HIGH (ref 1.8–7.4)
NEUTROPHILS NFR BLD AUTO: 81.7 % — HIGH (ref 43–77)
NRBC # BLD: 0 /100 WBCS — SIGNIFICANT CHANGE UP (ref 0–0)
PLATELET # BLD AUTO: 216 K/UL — SIGNIFICANT CHANGE UP (ref 150–400)
POTASSIUM SERPL-MCNC: 3.6 MMOL/L — SIGNIFICANT CHANGE UP (ref 3.5–5.3)
POTASSIUM SERPL-SCNC: 3.6 MMOL/L — SIGNIFICANT CHANGE UP (ref 3.5–5.3)
PROT SERPL-MCNC: 6.7 G/DL — SIGNIFICANT CHANGE UP (ref 6–8.3)
PROTHROM AB SERPL-ACNC: 11.4 SEC — SIGNIFICANT CHANGE UP (ref 10.5–13.4)
RBC # BLD: 4.52 M/UL — SIGNIFICANT CHANGE UP (ref 3.8–5.2)
RBC # FLD: 15.5 % — HIGH (ref 10.3–14.5)
RH IG SCN BLD-IMP: POSITIVE — SIGNIFICANT CHANGE UP
RH IG SCN BLD-IMP: POSITIVE — SIGNIFICANT CHANGE UP
SARS-COV-2 RNA SPEC QL NAA+PROBE: SIGNIFICANT CHANGE UP
SODIUM SERPL-SCNC: 139 MMOL/L — SIGNIFICANT CHANGE UP (ref 135–145)
WBC # BLD: 15.21 K/UL — HIGH (ref 3.8–10.5)
WBC # FLD AUTO: 15.21 K/UL — HIGH (ref 3.8–10.5)

## 2023-03-29 PROCEDURE — 99285 EMERGENCY DEPT VISIT HI MDM: CPT

## 2023-03-29 PROCEDURE — 85730 THROMBOPLASTIN TIME PARTIAL: CPT

## 2023-03-29 PROCEDURE — 36415 COLL VENOUS BLD VENIPUNCTURE: CPT

## 2023-03-29 PROCEDURE — 80053 COMPREHEN METABOLIC PANEL: CPT

## 2023-03-29 PROCEDURE — 84702 CHORIONIC GONADOTROPIN TEST: CPT

## 2023-03-29 PROCEDURE — 85025 COMPLETE CBC W/AUTO DIFF WBC: CPT

## 2023-03-29 PROCEDURE — 99284 EMERGENCY DEPT VISIT MOD MDM: CPT

## 2023-03-29 PROCEDURE — 85610 PROTHROMBIN TIME: CPT

## 2023-03-29 RX ORDER — MIFEPRISTONE 300 MG/1
200 TABLET, FILM COATED ORAL ONCE
Refills: 0 | Status: COMPLETED | OUTPATIENT
Start: 2023-03-29 | End: 2023-03-30

## 2023-03-29 NOTE — OB PROVIDER H&P - ASSESSMENT
R4 Addendum    In short, patient is a 41 yo  at 20.6 wks GA presenting as transfer from [facility] for inevitable AB.  On PE, patient found to be approx. 5 cm dilated w/ bulging membranes, feet within the vagina and +FH.  Reviewed these findings with the patient and diagnosis of inevitable Ab.  Patient counseled on options of expectant management, medical IOL or surgical management w/ D&E.  Pt declines D&E as it is important to have an intact fetus and morally cannot terminate the pregnancy.  We discussed medical recommendation for IOL as prolonging the pregnancy may result in chorio, coagulopathy, and maternal compromise.  Pt was counseled that this is not a viable pregnancy and there are no options to salvage the pregnancy.  Given no existing visible cervix and visibly bulging membranes, cerclage is contraindicated.  We discussed that although expectant management is an option, it would be unlikely the patient remains pregnant to an age of viability, and extreme viability may result in many  developmental problems.  Additionally remaining pregnant poses threat of maternal decompensation.  Amniocentesis for cytogenetics and microarray was discussed with MFM and not recommended as this is an inevitable Ab not IUFD, instead option of cytogenetics with fetal tissue or placenta as rapidly dividing cells are not required.  Process for induction reviewed, including use of mifepristone and misoprostol, IV or regional anesthesia at direction of patient, and postpartum plan.  Pt is aware of need for burial of fetus over 20 weeks by [place] state law.  At this time, all questions answered to patient satisfaction.  Pt is not ready at this time to begin induction process.  Plan at this time:    -CBC, PT/PTT/INR, fibrinogen, T&S.  Pt aware that if leukocytosis or coagulpathy need to expedite initiation of induction  -IV insert  -cytogenetics w/ microarray following delivery  -placental cultures  -social work   -anesthesia PRN  -induction of labor to begin when patient amenable    d/w Dr. Edwin Rainey PGY4

## 2023-03-29 NOTE — OB RN PATIENT PROFILE - NSICDXPASTMEDICALHX_GEN_ALL_CORE_FT
PAST MEDICAL HISTORY:  Gestational diabetes     Miscarriage     Termination of pregnancy (fetus)     Termination of pregnancy (fetus)     Vaginal delivery

## 2023-03-29 NOTE — OB RN PATIENT PROFILE - FALL HARM RISK - UNIVERSAL INTERVENTIONS
Bed in lowest position, wheels locked, appropriate side rails in place/Call bell, personal items and telephone in reach/Instruct patient to call for assistance before getting out of bed or chair/Non-slip footwear when patient is out of bed/[place] to call system/Physically safe environment - no spills, clutter or unnecessary equipment/Purposeful Proactive Rounding/Room/bathroom lighting operational, light cord in reach

## 2023-03-29 NOTE — OB PROVIDER H&P - NSLOWPPHRISK_OBGYN_A_OB
No previous uterine incision/[place] Pregnancy/No known bleeding disorder/No history of postpartum hemorrhage

## 2023-03-29 NOTE — OB PROVIDER H&P - HISTORY OF PRESENT ILLNESS
R2 Admission H&P    Subjective  HPI: 42yoF  @20w6d transferred from [facility] for vaginal pressure, rule out inevitable . Patient reports that she was straining to have a bowel movement this morning when she felt pressure in her vagina like "something was coming out". At this time (around 11:30AM this morning) she noticed a small amount of mucous/bloody vaginal discharge while wiping as well as a small blood clot in the toilet. She has not noticed any vaginal bleeding or leakage of fluid since.  She denies contractions, denies abdominal pain. Denies fevers, chills, SOB, chest pain, nausea, vomiting, diarrhea, dysuria. She has been having constipation for the past week, reports multiple small hard bowel movements per day and has had to strain, has not taken stool softeners. Reports that genetic screening (NIPT/Quad screen) during this pregnancy has been normal.  Denies sick contacts. She reports having a mild case of COVID about 2 months ago, did not require hospitalization or treatment.     PNC:   - GDMA1 (Fasting FS have been 90s, postprandial ranging from 100s-150s) and patient was supposed to be started on insulin 10u in the evenings  (insulin type unknown) this week).   - Elevated BPs in early pregnancy, not currently on BP medications.  Patient was seen by Cardiology early this pregnancy and reports that EKG/Echo were wnl.   - 5cm fibroid, pt unsure of location.   ObHx:   - FT  2013 5#5, uncomplicated  - SABx1 @ 6 weeks  s/p D&C  - TOP w/ D&C x2 years ago   GynHx: +Fibroid as above.  Denies hx of ovarian cysts, abnml PAP smears, STIs  MedHx: ?cHTN, GDM.   - Denies hx of asthma, thyroid problems, blood clots/bleeding problems, hx of blood transfusions.  Meds: PNV, ASA 81mg QD   All: NKDA  PSHx: Lasic surgery   FHx: Denies hx of blood clots/bleeding problems  Social: Denies alcohol/tobacco/drug use in pregnancy  Psych: Denies hx of anxiety/depression     – Will accept blood transfusions? Yes      Objective  – VS  T(C): 36.9 (23 @ 13:51)  HR: 87 (23 @ 15:00)  BP: 125/74 (23 @ 14:55)  RR: 16 (23 @ 13:51)  SpO2: 94% (23 @ 15:00)    – PE:   CV: RRR  Pulm: breathing comfortably on RA  Abd: gravid, nontender  Extr: moving all extremities with ease    – Spec: bulging membranes in the vagina with no visible cervix    – Sono: amniotic sac w/ hourglass appearance bulging into the vagina, breech presentation with foot and umbilical cord in the vagina, posterior placenta.   +, +fetal movement.     Assessment   41 yo F   @20w6d transferred from [facility] for inevitable .      Plan  - Admit to L&D  - Patient counseled regarding diagnosis and options for management including expectant vs. induction of labor vs surgical management.    - Patient and partner taking time to discuss options, will re-evaluate for further counseling and management.       Patient seen and examined franchesca Geronimo PGY4, D/w Dr Pineda.  Bre, PGY2 R2 Admission H&P    Subjective  HPI: 42yoF  @20w6d transferred from [facility] for vaginal pressure, rule out inevitable . Patient reports that she was straining to have a bowel movement this morning when she felt pressure in her vagina like "something was coming out". At this time (around 11:30AM this morning) she noticed a small amount of mucous/bloody vaginal discharge while wiping as well as a small blood clot in the toilet. She has not noticed any vaginal bleeding or leakage of fluid since.  She denies contractions, denies abdominal pain. Denies fevers, chills, SOB, chest pain, nausea, vomiting, diarrhea, dysuria. She has been having constipation for the past week, reports multiple small hard bowel movements per day and has had to strain, has not taken stool softeners. Reports that genetic screening (NIPT/Quad screen) during this pregnancy has been normal.  Denies sick contacts. She reports having a mild case of COVID about 2 months ago, did not require hospitalization or treatment.     PNC:   - GDMA1 (Fasting FS have been 90s, postprandial ranging from 100s-150s) and patient was supposed to be started on insulin 10u in the evenings  (insulin type unknown) this week).   - Elevated BPs in early pregnancy, not currently on BP medications.  Patient was seen by Cardiology early this pregnancy and reports that EKG/Echo were wnl.   - 5cm fibroid, anterior subserosal  ObHx:   - FT  2013 5#5, uncomplicated  - SABx1 @ 6 weeks  s/p D&C  - TOP w/ D&C x2 years ago   GynHx: +Fibroid as above.  Denies hx of ovarian cysts, abnml PAP smears, STIs  MedHx: ?cHTN, GDM.   - Denies hx of asthma, thyroid problems, blood clots/bleeding problems, hx of blood transfusions.  Meds: PNV, ASA 81mg QD   All: NKDA  PSHx: Lasic surgery   FHx: Denies hx of blood clots/bleeding problems  Social: Denies alcohol/tobacco/drug use in pregnancy  Psych: Denies hx of anxiety/depression     – Will accept blood transfusions? Yes      Objective  – VS  T(C): 36.9 (23 @ 13:51)  HR: 87 (23 @ 15:00)  BP: 125/74 (23 @ 14:55)  RR: 16 (23 @ 13:51)  SpO2: 94% (23 @ 15:00)    – PE:   CV: RRR  Pulm: breathing comfortably on RA  Abd: gravid, nontender  Extr: moving all extremities with ease    – Spec: bulging membranes in the vagina with no visible cervix    – Sono: amniotic sac w/ hourglass appearance bulging into the vagina, breech presentation with foot and umbilical cord in the vagina, posterior placenta.   +, +fetal movement.     Assessment   43 yo F   @20w6d transferred from [facility] for inevitable .      Plan  - Admit to L&D  - Patient counseled regarding diagnosis and options for management including expectant vs. induction of labor vs surgical management.    - Patient and partner taking time to discuss options, will re-evaluate for further counseling and management.       Patient seen and examined franchesca Geronimo PGY4, D/w Dr Pineda.  Bre, PGY2

## 2023-03-29 NOTE — OB RN TRIAGE NOTE - FALL HARM RISK - UNIVERSAL INTERVENTIONS
Bed in lowest position, wheels locked, appropriate side rails in place/Call bell, personal items and telephone in reach/Instruct patient to call for assistance before getting out of bed or chair/Non-slip footwear when patient is out of bed/[place] to call system/Physically safe environment - no spills, clutter or unnecessary equipment/Purposeful Proactive Rounding/Room/bathroom lighting operational, light cord in reach

## 2023-03-29 NOTE — ED ADULT TRIAGE NOTE - INTERNATIONAL TRAVEL
Preventive Health Recommendations  Male Ages 50 - 64    Your heart monitor was overall okay. Mainly related to anxiety.     Okay to cut the metoprolol in 1/2.     The finger looks good. It will be another 2 months to completely heal. Just protect it.     The lung scan was okay, no signs of lung cancer. There are some nodules or lungs that just need to watched. Yearly lung scan.     For the sour stomach, take a night time antiacid, famotidine.      For the anxiety, expected increased anxiety for 2-3 weeks, then it should be better.     I agree with increasing the Prosac or fluoxetine to 20 mg daily.     Another medication we can try for another medication called Buspar.     Thoughts of dying are normal when down. Worry to think of a detail plan to hurt yourself.     Stay with the psychiatrist who fits best.     For the neck, think about physical therapy. If you have not heard from the scheduling office within 2 business days, please call [phone] for [facility],    You're welcome to stay with me for primary care.     I'm okay with some lorazepam, let me know if you need  a refill.     Follow up appointment in one or two months.     Keep up with dentist.       Yearly exam:             See your health care provider every year in order to  o   Review health changes.   o   Discuss preventive care.    o   Review your medicines if your doctor has prescribed any.   Have a cholesterol test every 5 years, or more frequently if you are at risk for high cholesterol/heart disease.   Have a diabetes test (fasting glucose) every three years. If you are at risk for diabetes, you should have this test more often.   Have a colonoscopy at age 50, or have a yearly FIT test (stool test). These exams will check for colon cancer.    Talk with your health care provider about whether or not a prostate cancer screening test (PSA) is right for you.  You should be tested each year for STDs (sexually transmitted diseases), if  you re at risk.     Shots: Get a flu shot each year. Get a tetanus shot every 10 years.     Nutrition:  Eat at least 5 servings of fruits and vegetables daily.   Eat whole-grain bread, whole-wheat pasta and brown rice instead of white grains and rice.   Get adequate Calcium and Vitamin D.     Lifestyle  Exercise for at least 150 minutes a week (30 minutes a day, 5 days a week). This will help you control your weight and prevent disease.   Limit alcohol to one drink per day.   No smoking.   Wear sunscreen to prevent skin cancer.   See your dentist every six months for an exam and cleaning.   See your eye doctor every 1 to 2 years.     No

## 2023-03-29 NOTE — ED ADULT TRIAGE NOTE - CHIEF COMPLAINT QUOTE
patient 20 weeks pregnant, c/o vaginal bleeding, passing clots, 10 min ago, denies abd pain, dizziness

## 2023-03-29 NOTE — OB RN PATIENT PROFILE - ABILITY TO HEAR (WITH HEARING AID OR HEARING APPLIANCE IF NORMALLY USED):
Adequate: hears normal conversation without difficulty Azathioprine Counseling:  I discussed with the patient the risks of azathioprine including but not limited to myelosuppression, immunosuppression, hepatotoxicity, lymphoma, and infections.  The patient understands that monitoring is required including baseline LFTs, Creatinine, possible TPMP genotyping and weekly CBCs for the first month and then every 2 weeks thereafter.  The patient verbalized understanding of the proper use and possible adverse effects of azathioprine.  All of the patient's questions and concerns were addressed.

## 2023-03-29 NOTE — CHART NOTE - NSCHARTNOTEFT_GEN_A_CORE
Chart Note    Introduced myself to patient. Discussed management options for inevitable AB. Patient not ready to proceed with IOL at this time. All questions answered. Reassurance provided.    Yvonne Cota, PGY-3

## 2023-03-29 NOTE — ED ADULT TRIAGE NOTE - WEIGHT IN KG
You can access the [facility] Portal, offered by [facility], by registering with the following website: [email or web address]
79.4

## 2023-03-29 NOTE — OB PROVIDER LABOR PROGRESS NOTE - ASSESSMENT
Pt admitted with bulging membranes, cervix not seen on sterile spec exam but pooling of fluid with blood noted.  TORY positive.    I explained again to pt that loss of pregnancy is inevitable. Pt has been undecided all day about actively terminating pregnancy.  Now she is ruptured, explained to her that risk of infection is high which can lead to maternal compromise, hemorrhage, hysterectomy all possible if pregnancy is continued and unlikely to reach viability.  Her and  are deciding and have signed consent for termination of pregnancy by miso/mifi, possible pitocin, possible D&C reviewed and consented. Pt is afebrile, wbc 15k.  Support provided.  Explained option of autopsy, chromosome/microarray all reviewed.  Pt aware of procedure to see baby, and arrange for burial. cmelgar

## 2023-03-30 ENCOUNTER — TRANSCRIPTION ENCOUNTER (OUTPATIENT)
Age: 43
End: 2023-03-30

## 2023-03-30 VITALS
SYSTOLIC BLOOD PRESSURE: 125 MMHG | TEMPERATURE: 98 F | RESPIRATION RATE: 18 BRPM | OXYGEN SATURATION: 99 % | HEART RATE: 75 BPM | DIASTOLIC BLOOD PRESSURE: 81 MMHG

## 2023-03-30 LAB
APPEARANCE UR: CLEAR — SIGNIFICANT CHANGE UP
BACTERIA # UR AUTO: NEGATIVE — SIGNIFICANT CHANGE UP
BILIRUB UR-MCNC: NEGATIVE — SIGNIFICANT CHANGE UP
COLOR SPEC: SIGNIFICANT CHANGE UP
COVID-19 SPIKE DOMAIN AB INTERP: POSITIVE
COVID-19 SPIKE DOMAIN ANTIBODY RESULT: >250 U/ML — HIGH
DIFF PNL FLD: ABNORMAL
EPI CELLS # UR: 3 /HPF — SIGNIFICANT CHANGE UP
GLUCOSE UR QL: NEGATIVE — SIGNIFICANT CHANGE UP
HYALINE CASTS # UR AUTO: 1 /LPF — SIGNIFICANT CHANGE UP (ref 0–2)
KETONES UR-MCNC: ABNORMAL
LEUKOCYTE ESTERASE UR-ACNC: NEGATIVE — SIGNIFICANT CHANGE UP
NITRITE UR-MCNC: NEGATIVE — SIGNIFICANT CHANGE UP
PH UR: 6.5 — SIGNIFICANT CHANGE UP (ref 5–8)
PROT UR-MCNC: ABNORMAL
RBC CASTS # UR COMP ASSIST: 12 /HPF — HIGH (ref 0–4)
SARS-COV-2 IGG+IGM SERPL QL IA: >250 U/ML — HIGH
SARS-COV-2 IGG+IGM SERPL QL IA: POSITIVE
SP GR SPEC: 1.02 — SIGNIFICANT CHANGE UP (ref 1.01–1.02)
T PALLIDUM AB TITR SER: NEGATIVE — SIGNIFICANT CHANGE UP
UROBILINOGEN FLD QL: NEGATIVE — SIGNIFICANT CHANGE UP
WBC UR QL: 3 /HPF — SIGNIFICANT CHANGE UP (ref 0–5)

## 2023-03-30 PROCEDURE — 88305 TISSUE EXAM BY PATHOLOGIST: CPT

## 2023-03-30 PROCEDURE — 86769 SARS-COV-2 COVID-19 ANTIBODY: CPT

## 2023-03-30 PROCEDURE — 85025 COMPLETE CBC W/AUTO DIFF WBC: CPT

## 2023-03-30 PROCEDURE — 88264 CHROMOSOME ANALYSIS 20-25: CPT

## 2023-03-30 PROCEDURE — 88233 TISSUE CULTURE SKIN/BIOPSY: CPT

## 2023-03-30 PROCEDURE — 80053 COMPREHEN METABOLIC PANEL: CPT

## 2023-03-30 PROCEDURE — 86850 RBC ANTIBODY SCREEN: CPT

## 2023-03-30 PROCEDURE — 81001 URINALYSIS AUTO W/SCOPE: CPT

## 2023-03-30 PROCEDURE — 85384 FIBRINOGEN ACTIVITY: CPT

## 2023-03-30 PROCEDURE — 85610 PROTHROMBIN TIME: CPT

## 2023-03-30 PROCEDURE — 86780 TREPONEMA PALLIDUM: CPT

## 2023-03-30 PROCEDURE — 85730 THROMBOPLASTIN TIME PARTIAL: CPT

## 2023-03-30 PROCEDURE — 87635 SARS-COV-2 COVID-19 AMP PRB: CPT

## 2023-03-30 PROCEDURE — 82962 GLUCOSE BLOOD TEST: CPT

## 2023-03-30 PROCEDURE — 81229 CYTOG ALYS CHRML ABNR SNPCGH: CPT

## 2023-03-30 PROCEDURE — 88305 TISSUE EXAM BY PATHOLOGIST: CPT | Mod: 26

## 2023-03-30 PROCEDURE — 86901 BLOOD TYPING SEROLOGIC RH(D): CPT

## 2023-03-30 PROCEDURE — 87086 URINE CULTURE/COLONY COUNT: CPT

## 2023-03-30 PROCEDURE — 86900 BLOOD TYPING SEROLOGIC ABO: CPT

## 2023-03-30 PROCEDURE — 88280 CHROMOSOME KARYOTYPE STUDY: CPT

## 2023-03-30 RX ORDER — ONDANSETRON 8 MG/1
4 TABLET, FILM COATED ORAL ONCE
Refills: 0 | Status: DISCONTINUED | OUTPATIENT
Start: 2023-03-30 | End: 2023-03-30

## 2023-03-30 RX ORDER — ACETAMINOPHEN 500 MG
975 TABLET ORAL
Refills: 0 | Status: DISCONTINUED | OUTPATIENT
Start: 2023-03-30 | End: 2023-03-30

## 2023-03-30 RX ORDER — OXYTOCIN 10 UNIT/ML
41.67 VIAL (ML) INJECTION
Qty: 20 | Refills: 0 | Status: DISCONTINUED | OUTPATIENT
Start: 2023-03-30 | End: 2023-03-30

## 2023-03-30 RX ORDER — OXYCODONE HYDROCHLORIDE 5 MG/1
5 TABLET ORAL
Refills: 0 | Status: DISCONTINUED | OUTPATIENT
Start: 2023-03-30 | End: 2023-03-30

## 2023-03-30 RX ORDER — HYDROCORTISONE 1 %
1 OINTMENT (GRAM) TOPICAL EVERY 6 HOURS
Refills: 0 | Status: DISCONTINUED | OUTPATIENT
Start: 2023-03-30 | End: 2023-03-30

## 2023-03-30 RX ORDER — IBUPROFEN 200 MG
600 TABLET ORAL EVERY 6 HOURS
Refills: 0 | Status: DISCONTINUED | OUTPATIENT
Start: 2023-03-30 | End: 2023-03-30

## 2023-03-30 RX ORDER — DIBUCAINE 1 %
1 OINTMENT (GRAM) RECTAL EVERY 6 HOURS
Refills: 0 | Status: DISCONTINUED | OUTPATIENT
Start: 2023-03-30 | End: 2023-03-30

## 2023-03-30 RX ORDER — SIMETHICONE 80 MG/1
80 TABLET, CHEWABLE ORAL EVERY 4 HOURS
Refills: 0 | Status: DISCONTINUED | OUTPATIENT
Start: 2023-03-30 | End: 2023-03-30

## 2023-03-30 RX ORDER — CARBOPROST TROMETHAMINE 250 UG/ML
250 INJECTION, SOLUTION INTRAMUSCULAR
Refills: 0 | Status: DISCONTINUED | OUTPATIENT
Start: 2023-03-30 | End: 2023-03-30

## 2023-03-30 RX ORDER — KETOROLAC TROMETHAMINE 30 MG/ML
30 SYRINGE (ML) INJECTION ONCE
Refills: 0 | Status: DISCONTINUED | OUTPATIENT
Start: 2023-03-30 | End: 2023-03-30

## 2023-03-30 RX ORDER — DIPHENOXYLATE HCL/ATROPINE 2.5-.025MG
2 TABLET ORAL ONCE
Refills: 0 | Status: DISCONTINUED | OUTPATIENT
Start: 2023-03-30 | End: 2023-03-30

## 2023-03-30 RX ORDER — OXYCODONE HYDROCHLORIDE 5 MG/1
5 TABLET ORAL ONCE
Refills: 0 | Status: DISCONTINUED | OUTPATIENT
Start: 2023-03-30 | End: 2023-03-30

## 2023-03-30 RX ORDER — MAGNESIUM HYDROXIDE 400 MG/1
30 TABLET, CHEWABLE ORAL
Refills: 0 | Status: DISCONTINUED | OUTPATIENT
Start: 2023-03-30 | End: 2023-03-30

## 2023-03-30 RX ORDER — BENZOCAINE 10 %
1 GEL (GRAM) MUCOUS MEMBRANE EVERY 6 HOURS
Refills: 0 | Status: DISCONTINUED | OUTPATIENT
Start: 2023-03-30 | End: 2023-03-30

## 2023-03-30 RX ORDER — PRAMOXINE HYDROCHLORIDE 150 MG/15G
1 AEROSOL, FOAM RECTAL EVERY 4 HOURS
Refills: 0 | Status: DISCONTINUED | OUTPATIENT
Start: 2023-03-30 | End: 2023-03-30

## 2023-03-30 RX ORDER — LANOLIN
1 OINTMENT (GRAM) TOPICAL EVERY 6 HOURS
Refills: 0 | Status: DISCONTINUED | OUTPATIENT
Start: 2023-03-30 | End: 2023-03-30

## 2023-03-30 RX ORDER — SODIUM CHLORIDE 9 MG/ML
3 INJECTION INTRAMUSCULAR; INTRAVENOUS; SUBCUTANEOUS EVERY 8 HOURS
Refills: 0 | Status: DISCONTINUED | OUTPATIENT
Start: 2023-03-30 | End: 2023-03-30

## 2023-03-30 RX ORDER — DIPHENHYDRAMINE HCL 50 MG
25 CAPSULE ORAL EVERY 6 HOURS
Refills: 0 | Status: DISCONTINUED | OUTPATIENT
Start: 2023-03-30 | End: 2023-03-30

## 2023-03-30 RX ORDER — AER TRAVELER 0.5 G/1
1 SOLUTION RECTAL; TOPICAL EVERY 4 HOURS
Refills: 0 | Status: DISCONTINUED | OUTPATIENT
Start: 2023-03-30 | End: 2023-03-30

## 2023-03-30 RX ADMIN — Medication 30 MILLIGRAM(S): at 06:27

## 2023-03-30 RX ADMIN — MIFEPRISTONE 200 MILLIGRAM(S): 300 TABLET, FILM COATED ORAL at 01:10

## 2023-03-30 RX ADMIN — SODIUM CHLORIDE 3 MILLILITER(S): 9 INJECTION INTRAMUSCULAR; INTRAVENOUS; SUBCUTANEOUS at 14:39

## 2023-03-30 NOTE — DISCHARGE NOTE OB - PLAN OF CARE
Patient initially presented with inevitable  at 20w6d, had successful . The patient met all appropriate post partum milestones.  The patient was able to void, tolerated a regular diet, and ambulated on her own.  The patient was discharged on PPD#__ afebrile, with stable vital signs, and appropriate pain control. Patient initially presented with inevitable  at 20w6d, had successful . The patient met all appropriate post partum milestones.  The patient was able to void, tolerated a regular diet, and ambulated on her own.  The patient was discharged on PPD#0 afebrile, with stable vital signs, and appropriate pain control.

## 2023-03-30 NOTE — OB PROVIDER DELIVERY SUMMARY - NSPROVIDERDELIVERYNOTE_OBGYN_ALL_OB_FT
Patient reported pelvic pressure to RN, on exam noted to have fetal legs and torso in intact amniotic sac. Amniotic sac then ruptured with valsalva effort and fetus delivered spontaneously. Umbilical cord clamped and cut. No signs of life noted. Fetus passed to RN. Placenta not  yet. Hemabate x1 given and placenta delivered spontaneously. Bimanual exam performed and firm uterus noted, no retained tissue appreciated on exam. Bedside ultrasound performed and thin endometrial stripe noted. Good hemostasis noted. Count correct.   EBL 300cc Patient reported pelvic pressure to RN, on exam noted to have fetal legs and torso in intact amniotic sac. Amniotic sac then ruptured with valsalva effort and fetus delivered spontaneously. Umbilical cord clamped and cut. No signs of life noted. Fetus passed to RN. Placenta not  yet. Hemabate x1 given and placenta delivered spontaneously, intact. Bimanual exam performed and firm uterus noted, no retained tissue appreciated on exam. Bedside ultrasound performed and thin endometrial stripe noted. Good hemostasis noted. Count correct.   EBL 300cc

## 2023-03-30 NOTE — DISCHARGE NOTE OB - ADDITIONAL INSTRUCTIONS
After discharge, please stay on pelvic rest for 6 weeks, meaning no sexual intercourse, no tampons and no douching.  No driving for 2 weeks as women can loose a lot of blood during delivery and there is a possibility of being lightheaded/fainting.  No lifting objects heavier than baby for two weeks.  Expect to have vaginal bleeding/spotting for up to six weeks.  The bleeding should get lighter and more white/light brown with time.  For bleeding soaking more than a pad an hour or passing clots greater than the size of your fist, come in to the emergency department.    Follow up in office as instructed.

## 2023-03-30 NOTE — OB PROVIDER LABOR PROGRESS NOTE - NS_SUBJECTIVE/OBJECTIVE_OBGYN_ALL_OB_FT
TAUS performed: footling breech presentation, posterior placenta, +FHR, +fetal mvt
pt admitted with bulging membranes at 20 weeks

## 2023-03-30 NOTE — DISCHARGE NOTE OB - HOSPITAL COURSE
42 y.o.  presenting @20w6d as a transfer from [facility] due to vaginal bleeding and concern for possible  labor. Upon speculum exam she was noted to have bulging membranes in the vagina with no visible cervix. On transabdominal ultrasound she also had hourglass appears of amnionitic sac w/ feet and umbilical cord in the vagina. Due to this, patient was counselled on inevitable . She agreed to induction and had  on HD#2, . The patient met all appropriate post partum milestones.  The patient was able to void, tolerated a regular diet, and ambulated on her own.  She was seen by social work postpartum. The patient was discharged on PPD___ afebrile, with stable vital signs, and appropriate pain control. She was instructed to follow up closely with OB.  42 y.o.  presenting @20w6d as a transfer from [facility] due to vaginal bleeding and concern for possible  labor. Upon speculum exam she was noted to have bulging membranes in the vagina with no visible cervix. On transabdominal ultrasound she also had hourglass appears of amnionitic sac w/ feet and umbilical cord in the vagina. Due to this, patient was counselled on inevitable . She agreed to induction and had  on HD#2, . The patient met all appropriate post partum milestones.  The patient was able to void, tolerated a regular diet, and ambulated on her own.  She was seen by social work postpartum. The patient was discharged on PPD#0 afebrile, with stable vital signs, and appropriate pain control. She was instructed to follow up closely with OB.

## 2023-03-30 NOTE — DISCHARGE NOTE OB - CARE PLAN
1 Principal Discharge DX:	Inevitable   Assessment and plan of treatment:	Patient initially presented with inevitable  at 20w6d, had successful . The patient met all appropriate post partum milestones.  The patient was able to void, tolerated a regular diet, and ambulated on her own.  The patient was discharged on PPD#__ afebrile, with stable vital signs, and appropriate pain control.   Principal Discharge DX:	Inevitable   Assessment and plan of treatment:	Patient initially presented with inevitable  at 20w6d, had successful . The patient met all appropriate post partum milestones.  The patient was able to void, tolerated a regular diet, and ambulated on her own.  The patient was discharged on PPD#0 afebrile, with stable vital signs, and appropriate pain control.

## 2023-03-30 NOTE — DISCHARGE NOTE OB - MEDICATION SUMMARY - MEDICATIONS TO TAKE
I will START or STAY ON the medications listed below when I get home from the hospital:    acetaminophen 325 mg oral tablet  -- 3 by mouth every 6 hours as needed for  mild pain  -- Indication: For Mild pain    ibuprofen 600 mg oral tablet  -- 1 by mouth every 6 hours as needed for  moderate pain  -- Indication: For Moderate pain

## 2023-03-30 NOTE — OB PROVIDER DELIVERY SUMMARY - NSSELHIDDEN_OBGYN_ALL_OB_FT
[NS_DeliveryAttending1_OBGYN_ALL_OB_FT:[id number]==],[NS_DeliveryAttending2_OBGYN_ALL_OB_FT:[id number]]

## 2023-03-30 NOTE — OB RN DELIVERY SUMMARY - NSSELHIDDEN_OBGYN_ALL_OB_FT
[NS_DeliveryAttending1_OBGYN_ALL_OB_FT:[id number]==],[NS_DeliveryAttending2_OBGYN_ALL_OB_FT:[id number]],[NS_DeliveryRN_OBGYN_ALL_OB_FT:[id number]==]

## 2023-03-30 NOTE — DISCHARGE NOTE OB - PATIENT PORTAL LINK FT
You can access the FollowMyHealth Patient Portal offered by [facility] by registering at the following website: [email or web address]. By joining [employer]’s FollowMyHealth portal, you will also be able to view your health information using other applications (apps) compatible with our system.

## 2023-03-30 NOTE — DISCHARGE NOTE OB - CARE PROVIDER_API CALL
Destiny Ramos (MD)  Obstetrics and Gynecology  [address], [address]  [address]  [place] [id number]  Phone: [phone]  Fax: [phone]  Follow Up Time:

## 2023-03-30 NOTE — CHART NOTE - NSCHARTNOTEFT_GEN_A_CORE
Pt seen in LDR.  Recovering well, stable.  Epidural wearing off, begin to ambulate, due to void, try reg diet.  Lochia has been normal.  My condolences offered, pt and  coping appropriately.  SW consult recommended.  Once meeting milestones will be stable for dc home.  Instruc reviewed.  F/u 4 wks.  BIANKA Paul

## 2023-03-31 LAB
CULTURE RESULTS: SIGNIFICANT CHANGE UP
SPECIMEN SOURCE: SIGNIFICANT CHANGE UP

## 2023-04-14 LAB — SURGICAL PATHOLOGY STUDY: SIGNIFICANT CHANGE UP

## 2023-04-18 PROBLEM — O24.419 GESTATIONAL DIABETES MELLITUS IN PREGNANCY, UNSPECIFIED CONTROL: Chronic | Status: ACTIVE | Noted: 2023-03-29

## 2023-04-18 PROBLEM — O03.9 COMPLETE OR UNSPECIFIED SPONTANEOUS ABORTION WITHOUT COMPLICATION: Chronic | Status: ACTIVE | Noted: 2023-03-29

## 2023-04-18 PROBLEM — Z33.2 ENCOUNTER FOR ELECTIVE TERMINATION OF PREGNANCY: Chronic | Status: ACTIVE | Noted: 2023-03-29

## 2023-04-24 LAB — CHROM ANALY OVERALL INTERP SPEC-IMP: SIGNIFICANT CHANGE UP

## 2023-04-28 ENCOUNTER — NON-APPOINTMENT (OUTPATIENT)
Age: 43
End: 2023-04-28

## 2023-04-28 ENCOUNTER — APPOINTMENT (OUTPATIENT)
Dept: INTERNAL MEDICINE | Facility: CLINIC | Age: 43
End: 2023-04-28
Payer: COMMERCIAL

## 2023-04-28 VITALS
TEMPERATURE: 98.2 F | WEIGHT: 163 LBS | HEART RATE: 76 BPM | RESPIRATION RATE: 14 BRPM | OXYGEN SATURATION: 99 % | DIASTOLIC BLOOD PRESSURE: 74 MMHG | HEIGHT: 63 IN | BODY MASS INDEX: 28.88 KG/M2 | SYSTOLIC BLOOD PRESSURE: 122 MMHG

## 2023-04-28 DIAGNOSIS — Z00.00 ENCOUNTER FOR GENERAL ADULT MEDICAL EXAMINATION W/OUT ABNORMAL FINDINGS: ICD-10-CM

## 2023-04-28 DIAGNOSIS — K59.00 CONSTIPATION, UNSPECIFIED: ICD-10-CM

## 2023-04-28 DIAGNOSIS — Z23 ENCOUNTER FOR IMMUNIZATION: ICD-10-CM

## 2023-04-28 DIAGNOSIS — Z83.1 FAMILY HISTORY OF OTHER INFECTIOUS AND PARASITIC DISEASES: ICD-10-CM

## 2023-04-28 PROCEDURE — 99386 PREV VISIT NEW AGE 40-64: CPT | Mod: 25

## 2023-04-28 PROCEDURE — 90715 TDAP VACCINE 7 YRS/> IM: CPT

## 2023-04-28 PROCEDURE — 90471 IMMUNIZATION ADMIN: CPT

## 2023-04-28 NOTE — HEALTH RISK ASSESSMENT
[No] : No [1] : 2) Feeling down, depressed, or hopeless for several days (1) [PHQ-2 Positive] : PHQ-2 Positive [Nearly Every Day (3)] : 3.) Trouble falling asleep, or sleeping too much? Nearly every day [1/2 of Days or More (2)] : 6.) Feeling bad about yourself, or that you are a failure, or have let yourself or your family down? Half the days or more [Several Days (1)] : 8.) Moving or speaking so slowly that other people could have noticed, or the opposite, moving or speaking faster than usual? Several days [Moderate] : severity of depression is moderate [Somewhat Difficult] : How difficult have these problems made it for you to do your work, take care of things at home, or get along with people? Somewhat difficult [PHQ-9 Positive] : PHQ-9 Positive [I have developed a follow-up plan documented below in the note.] : I have developed a follow-up plan documented below in the note. [Never] : Never [WAC5Vrdma] : 2 [[id number]] : 11

## 2023-04-28 NOTE — HISTORY OF PRESENT ILLNESS
[Spouse] : spouse [FreeTextEntry1] : establish care and cpe [de-identified] : Pt had miscarriage recently at 21 weeks. She has been feeling sad about that.\par Had covid in January 2023.\par She c/o constipation for years with occasional bleeding with straining.

## 2023-05-03 LAB
25(OH)D3 SERPL-MCNC: 23.1 NG/ML
ALBUMIN SERPL ELPH-MCNC: 4 G/DL
ALP BLD-CCNC: 57 U/L
ALT SERPL-CCNC: 18 U/L
ANION GAP SERPL CALC-SCNC: 14 MMOL/L
AST SERPL-CCNC: 14 U/L
BASOPHILS # BLD AUTO: 0.04 K/UL
BASOPHILS NFR BLD AUTO: 0.4 %
BILIRUB SERPL-MCNC: 0.3 MG/DL
BUN SERPL-MCNC: 6 MG/DL
CALCIUM SERPL-MCNC: 9.1 MG/DL
CHLORIDE SERPL-SCNC: 103 MMOL/L
CHOLEST SERPL-MCNC: 192 MG/DL
CO2 SERPL-SCNC: 23 MMOL/L
CREAT SERPL-MCNC: 0.59 MG/DL
EGFR: 116 ML/MIN/1.73M2
EOSINOPHIL # BLD AUTO: 0.22 K/UL
EOSINOPHIL NFR BLD AUTO: 2.1 %
ESTIMATED AVERAGE GLUCOSE: 117 MG/DL
FOLATE SERPL-MCNC: >20 NG/ML
GLUCOSE SERPL-MCNC: 102 MG/DL
HBA1C MFR BLD HPLC: 5.7 %
HCT VFR BLD CALC: 40.2 %
HDLC SERPL-MCNC: 49 MG/DL
HGB BLD-MCNC: 12.3 G/DL
IMM GRANULOCYTES NFR BLD AUTO: 0.7 %
LDLC SERPL CALC-MCNC: 115 MG/DL
LYMPHOCYTES # BLD AUTO: 2.26 K/UL
LYMPHOCYTES NFR BLD AUTO: 21.9 %
MAN DIFF?: NORMAL
MCHC RBC-ENTMCNC: 26.9 PG
MCHC RBC-ENTMCNC: 30.6 GM/DL
MCV RBC AUTO: 88 FL
MONOCYTES # BLD AUTO: 0.4 K/UL
MONOCYTES NFR BLD AUTO: 3.9 %
NEUTROPHILS # BLD AUTO: 7.35 K/UL
NEUTROPHILS NFR BLD AUTO: 71 %
NONHDLC SERPL-MCNC: 143 MG/DL
PLATELET # BLD AUTO: 238 K/UL
POTASSIUM SERPL-SCNC: 4.7 MMOL/L
PROT SERPL-MCNC: 6.9 G/DL
RBC # BLD: 4.57 M/UL
RBC # FLD: 14.7 %
SODIUM SERPL-SCNC: 139 MMOL/L
TRIGL SERPL-MCNC: 142 MG/DL
TSH SERPL-ACNC: 1.35 UIU/ML
VIT B12 SERPL-MCNC: 622 PG/ML
WBC # FLD AUTO: 10.34 K/UL

## 2023-05-08 LAB — PRENATAL GENOME CHROMO MICROARRAY: NEGATIVE — SIGNIFICANT CHANGE UP

## 2023-05-12 PROBLEM — Z00.00 ENCOUNTER FOR PREVENTIVE HEALTH EXAMINATION: Noted: 2023-05-12

## 2023-06-09 ENCOUNTER — APPOINTMENT (OUTPATIENT)
Dept: HUMAN REPRODUCTION | Facility: CLINIC | Age: 43
End: 2023-06-09
Payer: COMMERCIAL

## 2023-06-09 PROCEDURE — 99215 OFFICE O/P EST HI 40 MIN: CPT | Mod: 95

## 2023-06-14 ENCOUNTER — APPOINTMENT (OUTPATIENT)
Dept: GASTROENTEROLOGY | Facility: CLINIC | Age: 43
End: 2023-06-14
Payer: COMMERCIAL

## 2023-06-14 VITALS
HEART RATE: 86 BPM | HEIGHT: 63 IN | WEIGHT: 165 LBS | DIASTOLIC BLOOD PRESSURE: 78 MMHG | SYSTOLIC BLOOD PRESSURE: 122 MMHG | TEMPERATURE: 97.6 F | OXYGEN SATURATION: 97 % | BODY MASS INDEX: 29.23 KG/M2

## 2023-06-14 PROCEDURE — 99203 OFFICE O/P NEW LOW 30 MIN: CPT

## 2023-06-14 RX ORDER — POLYETHYLENE GLYCOL 3350 17 G/17G
17 POWDER, FOR SOLUTION ORAL DAILY
Qty: 3 | Refills: 2 | Status: ACTIVE | COMMUNITY
Start: 2023-06-14 | End: 1900-01-01

## 2023-06-14 NOTE — PHYSICAL EXAM
[No Respiratory Distress] : no respiratory distress [No Acc Muscle Use] : no accessory muscle use [Abdomen Tenderness] : non-tender [Abdomen Soft] : soft [Normal] : oriented to person, place, and time [de-identified] : + ext hemorrhoid (small), no blood in rectal vault, + brown stool

## 2023-06-14 NOTE — ASSESSMENT
[FreeTextEntry1] : 42F, here with constipation and hematocehzia. No prior egd/colon. +fhx of colon polyps (father) and colon cancer (maternal aunt, age 60s). \par \par - Likely hematochezia 2/2 hemorrhoids in setting of constipation but ddx also includes mass, polyp, IBD \par - start miralx bowel regimen and titrate as needed \par - increase dietary soluble fiber and hydration \par - recommend colonoscopy for intrlauminal evaluation. Patient states she may be pregnant, has appt w OB next week. If pregnancy confirmed, will defer colonoscopy until after pregnancy. f not pregnant, she will call office to schedule colonoscopy. \par

## 2023-06-14 NOTE — HISTORY OF PRESENT ILLNESS
[FreeTextEntry1] : 42F, here today w constipation and hematochezia. \par \par She reports long standing constipation. \par Generally has hard stools and straining \par Recently had had several episodes of hematochezia \par Describes blood coating the stool or on toilet paper w wiping\par Denies abd pain, n/v, weight loss \par \par \par No prior egd/colon \par \par fhx - \par +colon cancer - maternal aunt, age 60s \par +colon polyps - dad \par

## 2023-06-14 NOTE — PHYSICAL EXAM
[No Respiratory Distress] : no respiratory distress [No Acc Muscle Use] : no accessory muscle use [Abdomen Tenderness] : non-tender [Abdomen Soft] : soft [Normal] : oriented to person, place, and time [de-identified] : + ext hemorrhoid (small), no blood in rectal vault, + brown stool

## 2023-07-10 ENCOUNTER — OUTPATIENT (OUTPATIENT)
Dept: OUTPATIENT SERVICES | Facility: HOSPITAL | Age: 43
LOS: 1 days | End: 2023-07-10
Payer: COMMERCIAL

## 2023-07-10 ENCOUNTER — TRANSCRIPTION ENCOUNTER (OUTPATIENT)
Age: 43
End: 2023-07-10

## 2023-07-10 VITALS
TEMPERATURE: 98 F | HEART RATE: 70 BPM | OXYGEN SATURATION: 95 % | SYSTOLIC BLOOD PRESSURE: 112 MMHG | DIASTOLIC BLOOD PRESSURE: 78 MMHG | WEIGHT: 164.02 LBS | RESPIRATION RATE: 18 BRPM | HEIGHT: 62 IN

## 2023-07-10 DIAGNOSIS — O02.1 MISSED ABORTION: ICD-10-CM

## 2023-07-10 DIAGNOSIS — Z98.890 OTHER SPECIFIED POSTPROCEDURAL STATES: Chronic | ICD-10-CM

## 2023-07-10 DIAGNOSIS — O24.419 GESTATIONAL DIABETES MELLITUS IN PREGNANCY, UNSPECIFIED CONTROL: ICD-10-CM

## 2023-07-10 LAB
BLD GP AB SCN SERPL QL: NEGATIVE — SIGNIFICANT CHANGE UP
HCT VFR BLD CALC: 41.5 % — SIGNIFICANT CHANGE UP (ref 34.5–45)
HGB BLD-MCNC: 13.2 G/DL — SIGNIFICANT CHANGE UP (ref 11.5–15.5)
MCHC RBC-ENTMCNC: 26.9 PG — LOW (ref 27–34)
MCHC RBC-ENTMCNC: 31.8 GM/DL — LOW (ref 32–36)
MCV RBC AUTO: 84.5 FL — SIGNIFICANT CHANGE UP (ref 80–100)
NRBC # BLD: 0 /100 WBCS — SIGNIFICANT CHANGE UP (ref 0–0)
PLATELET # BLD AUTO: 260 K/UL — SIGNIFICANT CHANGE UP (ref 150–400)
RBC # BLD: 4.91 M/UL — SIGNIFICANT CHANGE UP (ref 3.8–5.2)
RBC # FLD: 15.9 % — HIGH (ref 10.3–14.5)
RH IG SCN BLD-IMP: POSITIVE — SIGNIFICANT CHANGE UP
WBC # BLD: 10.69 K/UL — HIGH (ref 3.8–10.5)
WBC # FLD AUTO: 10.69 K/UL — HIGH (ref 3.8–10.5)

## 2023-07-10 PROCEDURE — 85027 COMPLETE CBC AUTOMATED: CPT

## 2023-07-10 PROCEDURE — 86850 RBC ANTIBODY SCREEN: CPT

## 2023-07-10 PROCEDURE — 86901 BLOOD TYPING SEROLOGIC RH(D): CPT

## 2023-07-10 PROCEDURE — G0463: CPT

## 2023-07-10 PROCEDURE — 86900 BLOOD TYPING SEROLOGIC ABO: CPT

## 2023-07-10 PROCEDURE — 83036 HEMOGLOBIN GLYCOSYLATED A1C: CPT

## 2023-07-10 RX ORDER — SODIUM CHLORIDE 9 MG/ML
1000 INJECTION, SOLUTION INTRAVENOUS
Refills: 0 | Status: DISCONTINUED | OUTPATIENT
Start: 2023-07-11 | End: 2023-07-26

## 2023-07-10 RX ORDER — IBUPROFEN 200 MG
1 TABLET ORAL
Qty: 0 | Refills: 0 | DISCHARGE

## 2023-07-10 NOTE — H&P PST ADULT - PROBLEM SELECTOR PLAN 1
HgbA1C sent in PST today. Pt instructed to decrease insulin by 20% tonight and take only 14units. No AM insulin or medications day of sx 7/11/23. FS to be checked in AM.

## 2023-07-10 NOTE — H&P PST ADULT - ANESTHESIA, PREVIOUS REACTION, PROFILE
[address] [facility] [address] [address]  Phone: [phone]   Fax:     [phone]    Physical Therapy  Cancellation/No-show Note  Patient Name:  Michaela Lester  :  1949   Date:  2022  Cancelled visits to date: 1  No-shows to date: 0    Patient status for today's appointment patient:  [x]  Cancelled  []  Rescheduled appointment  []  No-show     Reason given by patient:  [x]  Patient ill  []  Conflicting appointment  []  No transportation    []  Conflict with work  []  No reason given  []  Other:     Comments:    Phone call information:   []  Phone call made today to patient at _ time at number provided:      []  Patient answered, conversation as follows:    []  Patient did not answer, message left as follows:  [x]  Phone call not made today. - Pt called the clinic to cancel her appointment.      Electronically signed by:  Brandan De Souza PT none

## 2023-07-10 NOTE — H&P PST ADULT - HISTORY OF PRESENT ILLNESS
42yoF  at ~6wks missed  presents to PST for a scheduled Suction D&C tomorrow 23. Pt s/p miscarriage @ buddy 21wks in 3/23. PMH of Gestational DM. Denies recent fevers, chills, cough, chest pain or SOB and feels well otherwise.

## 2023-07-10 NOTE — H&P PST ADULT - NSANTHOSAYNRD_GEN_A_CORE
No. RHONA screening performed.  STOP BANG Legend: 0-2 = LOW Risk; 3-4 = INTERMEDIATE Risk; 5-8 = HIGH Risk

## 2023-07-10 NOTE — H&P PST ADULT - NEGATIVE GENERAL SYMPTOMS
-- DO NOT REPLY / DO NOT REPLY ALL --  -- Message is from the [facility]--      Patient is requesting a medication refill - medication is on active list    Was Medication Pended? Yes.    Rx Name and Dose:  famotidine (PEPCID) 40 MG tablet    Duration: 90 days    Pharmacy  [id number] - [place], [place] - [address] [facility] [address]    Patient confirmed the above pharmacy as correct?  Yes    Caller Information       Type Contact Phone    03/20/2020 12:32 PM Phone (Incoming) Lester Esquivel (Self) [phone] (H)          Alternative phone number: none    Turnaround time given to caller:   \"This message will be sent to [state Provider's name]. The clinical team will fulfill your request as soon as they review your message.\"   no fever/no chills/no sweating

## 2023-07-10 NOTE — H&P PST ADULT - NSICDXPROCEDURE_GEN_ALL_CORE_FT
PROCEDURES:  D&C (dilatation and curettage, scraping of uterus) 10-Jul-2023 14:08:02  Asuncion Flores

## 2023-07-10 NOTE — H&P PST ADULT - ATTENDING COMMENTS
41 yo P1 with missed Ab size c/w 6 wks.  R/B D&C vs observation or cytotec reviewed and pt elected D&C. Type ABpos.  BIANKA Paul

## 2023-07-11 ENCOUNTER — TRANSCRIPTION ENCOUNTER (OUTPATIENT)
Age: 43
End: 2023-07-11

## 2023-07-11 ENCOUNTER — OUTPATIENT (OUTPATIENT)
Dept: OUTPATIENT SERVICES | Facility: HOSPITAL | Age: 43
LOS: 1 days | Discharge: HOME | End: 2023-07-11
Payer: COMMERCIAL

## 2023-07-11 VITALS
OXYGEN SATURATION: 100 % | DIASTOLIC BLOOD PRESSURE: 68 MMHG | HEART RATE: 63 BPM | RESPIRATION RATE: 20 BRPM | SYSTOLIC BLOOD PRESSURE: 111 MMHG

## 2023-07-11 VITALS
OXYGEN SATURATION: 98 % | DIASTOLIC BLOOD PRESSURE: 75 MMHG | HEART RATE: 87 BPM | HEIGHT: 62 IN | TEMPERATURE: 97 F | WEIGHT: 164.02 LBS | RESPIRATION RATE: 16 BRPM | SYSTOLIC BLOOD PRESSURE: 132 MMHG

## 2023-07-11 DIAGNOSIS — Z98.890 OTHER SPECIFIED POSTPROCEDURAL STATES: Chronic | ICD-10-CM

## 2023-07-11 DIAGNOSIS — O02.1 MISSED ABORTION: ICD-10-CM

## 2023-07-11 LAB
A1C WITH ESTIMATED AVERAGE GLUCOSE RESULT: 5.8 % — HIGH (ref 4–5.6)
ESTIMATED AVERAGE GLUCOSE: 120 MG/DL — HIGH (ref 68–114)
GLUCOSE BLDC GLUCOMTR-MCNC: 79 MG/DL — SIGNIFICANT CHANGE UP (ref 70–99)

## 2023-07-11 PROCEDURE — 88233 TISSUE CULTURE SKIN/BIOPSY: CPT

## 2023-07-11 PROCEDURE — 86900 BLOOD TYPING SEROLOGIC ABO: CPT

## 2023-07-11 PROCEDURE — 88305 TISSUE EXAM BY PATHOLOGIST: CPT

## 2023-07-11 PROCEDURE — 86850 RBC ANTIBODY SCREEN: CPT

## 2023-07-11 PROCEDURE — 88280 CHROMOSOME KARYOTYPE STUDY: CPT

## 2023-07-11 PROCEDURE — 82962 GLUCOSE BLOOD TEST: CPT

## 2023-07-11 PROCEDURE — 86901 BLOOD TYPING SEROLOGIC RH(D): CPT

## 2023-07-11 PROCEDURE — 88305 TISSUE EXAM BY PATHOLOGIST: CPT | Mod: 26

## 2023-07-11 PROCEDURE — 59820 CARE OF MISCARRIAGE: CPT

## 2023-07-11 PROCEDURE — 88264 CHROMOSOME ANALYSIS 20-25: CPT

## 2023-07-11 RX ORDER — IBUPROFEN 200 MG
1 TABLET ORAL
Qty: 0 | Refills: 0 | DISCHARGE

## 2023-07-11 RX ORDER — ONDANSETRON 8 MG/1
4 TABLET, FILM COATED ORAL ONCE
Refills: 0 | Status: DISCONTINUED | OUTPATIENT
Start: 2023-07-11 | End: 2023-07-26

## 2023-07-11 RX ORDER — HUMAN INSULIN 100 [IU]/ML
18 INJECTION, SUSPENSION SUBCUTANEOUS
Refills: 0 | DISCHARGE

## 2023-07-11 RX ORDER — OXYCODONE HYDROCHLORIDE 5 MG/1
5 TABLET ORAL ONCE
Refills: 0 | Status: DISCONTINUED | OUTPATIENT
Start: 2023-07-11 | End: 2023-07-11

## 2023-07-11 RX ORDER — LIDOCAINE HCL 20 MG/ML
0.2 VIAL (ML) INJECTION ONCE
Refills: 0 | Status: COMPLETED | OUTPATIENT
Start: 2023-07-11 | End: 2023-07-11

## 2023-07-11 RX ORDER — ACETAMINOPHEN 500 MG
3 TABLET ORAL
Qty: 0 | Refills: 0 | DISCHARGE

## 2023-07-11 RX ADMIN — SODIUM CHLORIDE 100 MILLILITER(S): 9 INJECTION, SOLUTION INTRAVENOUS at 14:00

## 2023-07-11 NOTE — ASU DISCHARGE PLAN (ADULT/PEDIATRIC) - ASU DC SPECIAL INSTRUCTIONSFT
Return to your regular way of eating.  Complete vaginal rest, no tampons, no douching, no tub bathing, no sexual activities for 2 weeks unless otherwise instructed by your doctor.  Call your doctor with any signs and symptoms of infection such as fever, chills, nausea or vomiting.  Call your doctor if you're unable to tolerate food or have difficulty urinating.  Call your doctor if you have pain that is not relieved by Tylenol/Motrin. Notify your doctor with any other concerns.  Follow up with Dr. Paul in 2 weeks.

## 2023-07-11 NOTE — BRIEF OPERATIVE NOTE - NSICDXBRIEFPROCEDURE_GEN_ALL_CORE_FT
PROCEDURES:  Dilation and curettage, uterus, using suction, for missed first trimester  2023 14:58:19  Abby Jones

## 2023-07-11 NOTE — ASU DISCHARGE PLAN (ADULT/PEDIATRIC) - NS MD DC FALL RISK RISK
For information on Fall & Injury Prevention, visit: [email or web address] OR  [email or web address] OR  https://www.cdc.gov/steadi/patient.html

## 2023-07-11 NOTE — ASU DISCHARGE PLAN (ADULT/PEDIATRIC) - CARE PROVIDER_API CALL
Vishal Paul  Obstetrics and Gynecology  [address], [place] [address]  Phone: [phone]  Fax: [phone]  Established Patient  Follow Up Time: 2 weeks

## 2023-07-19 ENCOUNTER — APPOINTMENT (OUTPATIENT)
Dept: DERMATOLOGY | Facility: CLINIC | Age: 43
End: 2023-07-19
Payer: COMMERCIAL

## 2023-07-19 VITALS — HEIGHT: 63 IN | WEIGHT: 165 LBS | BODY MASS INDEX: 29.23 KG/M2

## 2023-07-19 DIAGNOSIS — D23.9 OTHER BENIGN NEOPLASM OF SKIN, UNSPECIFIED: ICD-10-CM

## 2023-07-19 DIAGNOSIS — Q82.5 CONGENITAL NON-NEOPLASTIC NEVUS: ICD-10-CM

## 2023-07-19 DIAGNOSIS — D48.9 NEOPLASM OF UNCERTAIN BEHAVIOR, UNSPECIFIED: ICD-10-CM

## 2023-07-19 LAB — SURGICAL PATHOLOGY STUDY: SIGNIFICANT CHANGE UP

## 2023-07-19 PROCEDURE — 11102 TANGNTL BX SKIN SINGLE LES: CPT

## 2023-07-19 PROCEDURE — 99203 OFFICE O/P NEW LOW 30 MIN: CPT | Mod: 25

## 2023-07-19 NOTE — HISTORY OF PRESENT ILLNESS
[FreeTextEntry1] : brown spot [de-identified] : Ms. PAIGE EVERETT is a 42 year old F here for evaluation of below\par \par Problem: brown spot on her leg that started as a pimple many months ago. \par Location: right leg, left back\par Duration: months-years \par Associated symptoms/Aggravating Factors: none\par Modifying factors/Treatments: none \par \par #Also has a brown spot on her back she wants evaluated \par #Brown spot on R buttock, xyears since childhood. Stable\par \par Personal hx of skin cancer: none\par FHx of skin cancer: none \par Social Hx: IT; recently had miscarriage\par \par

## 2023-07-19 NOTE — ASSESSMENT
[FreeTextEntry1] : # Neoplasm of uncertain behavior x1, L lateral torso\par - Rule out melanoma vs. congenital nevus\par - Recommend tangential shave biopsy for definitive diagnosis.  \par - After informed consent was obtained, using Hibiclens for cleansing and 1% Lidocaine with epinephrine for anesthetic, with sterile technique a shave biopsy was used to obtain a biopsy specimen of the lesion. Hemostasis was obtained with aluminum chloride.  Vaseline was applied, and wound care instructions provided. The specimen was labeled and sent to pathology for evaluation. The procedure was well tolerated without complication.  \par - The patient will be contacted with biopsy results\par \par # Dermatofibroma, R thigh\par - Benign diagnosis discussed - it is a ball of scar tissue underneath the skin in response to prior injury or trauma (bug bite)\par - No further treatment needed at this time\par - Advised patient against surgical removal given risk of poor wound healing with this type of lesion\par \par # Congenital melanocytic nevus, R buttock\par - Benign condition discussed with patient; pt reassured\par - Monitor moles for changes \par - Recommend wearing hats and sun protective clothing or OTC sunscreen products (SPF 30+, broad band, EltaMD/La Roche Posay/Neutrogena) daily on your face and entire body (apply sunscreen atleast 30 minutes prior to going outside). Reapply sunscreen every 2 hours when outside.\par

## 2023-07-19 NOTE — PHYSICAL EXAM
[Alert] : alert [Oriented x 3] : ~L oriented x 3 [Well Nourished] : well nourished [Conjunctiva Non-injected] : conjunctiva non-injected [No Visual Lymphadenopathy] : no visual  lymphadenopathy [No Clubbing] : no clubbing [No Edema] : no edema [No Bromhidrosis] : no bromhidrosis [No Chromhidrosis] : no chromhidrosis [Declined] : declined [FreeTextEntry3] : Focused exam only (see below) per patient request:\par \par irregular dark brown papule L lateral torso\par \par homogenous brown papule R buttock\par \par firm pigmented papule R distal thigh laterally\par \par \par

## 2023-07-24 ENCOUNTER — APPOINTMENT (OUTPATIENT)
Dept: GASTROENTEROLOGY | Facility: CLINIC | Age: 43
End: 2023-07-24

## 2023-07-31 LAB — DERMATOLOGY BIOPSY: NORMAL

## 2023-08-04 LAB — CHROM ANALY OVERALL INTERP SPEC-IMP: SIGNIFICANT CHANGE UP

## 2023-08-07 PROBLEM — O02.1 MISSED ABORTION: Chronic | Status: ACTIVE | Noted: 2023-07-10

## 2023-08-10 ENCOUNTER — APPOINTMENT (OUTPATIENT)
Dept: GASTROENTEROLOGY | Facility: CLINIC | Age: 43
End: 2023-08-10

## 2023-08-11 ENCOUNTER — APPOINTMENT (OUTPATIENT)
Dept: HUMAN REPRODUCTION | Facility: CLINIC | Age: 43
End: 2023-08-11
Payer: COMMERCIAL

## 2023-08-11 PROCEDURE — 36415 COLL VENOUS BLD VENIPUNCTURE: CPT

## 2023-08-18 ENCOUNTER — APPOINTMENT (OUTPATIENT)
Dept: HUMAN REPRODUCTION | Facility: CLINIC | Age: 43
End: 2023-08-18
Payer: COMMERCIAL

## 2023-08-18 PROCEDURE — 36415 COLL VENOUS BLD VENIPUNCTURE: CPT

## 2023-08-25 ENCOUNTER — APPOINTMENT (OUTPATIENT)
Dept: HUMAN REPRODUCTION | Facility: CLINIC | Age: 43
End: 2023-08-25
Payer: COMMERCIAL

## 2023-08-25 PROCEDURE — 36415 COLL VENOUS BLD VENIPUNCTURE: CPT

## 2023-09-01 ENCOUNTER — APPOINTMENT (OUTPATIENT)
Dept: HUMAN REPRODUCTION | Facility: CLINIC | Age: 43
End: 2023-09-01
Payer: COMMERCIAL

## 2023-09-01 PROCEDURE — 36415 COLL VENOUS BLD VENIPUNCTURE: CPT

## 2023-09-08 ENCOUNTER — APPOINTMENT (OUTPATIENT)
Dept: HUMAN REPRODUCTION | Facility: CLINIC | Age: 43
End: 2023-09-08
Payer: COMMERCIAL

## 2023-09-08 PROCEDURE — 36415 COLL VENOUS BLD VENIPUNCTURE: CPT

## 2023-09-15 ENCOUNTER — APPOINTMENT (OUTPATIENT)
Dept: HUMAN REPRODUCTION | Facility: CLINIC | Age: 43
End: 2023-09-15
Payer: COMMERCIAL

## 2023-09-15 PROCEDURE — 58999I: CUSTOM

## 2023-09-15 PROCEDURE — 76831 ECHO EXAM UTERUS: CPT

## 2023-09-15 PROCEDURE — 58340 CATHETER FOR HYSTEROGRAPHY: CPT

## 2023-09-15 PROCEDURE — 99213 OFFICE O/P EST LOW 20 MIN: CPT | Mod: 25

## 2023-10-17 ENCOUNTER — APPOINTMENT (OUTPATIENT)
Dept: HUMAN REPRODUCTION | Facility: CLINIC | Age: 43
End: 2023-10-17
Payer: COMMERCIAL

## 2023-10-17 PROCEDURE — 36415 COLL VENOUS BLD VENIPUNCTURE: CPT

## 2023-10-19 ENCOUNTER — APPOINTMENT (OUTPATIENT)
Dept: HUMAN REPRODUCTION | Facility: CLINIC | Age: 43
End: 2023-10-19
Payer: COMMERCIAL

## 2023-10-19 PROCEDURE — 76998 US GUIDE INTRAOP: CPT

## 2023-10-19 PROCEDURE — 58559 HYSTEROSCOPY LYSIS: CPT

## 2023-11-02 ENCOUNTER — APPOINTMENT (OUTPATIENT)
Dept: HUMAN REPRODUCTION | Facility: CLINIC | Age: 43
End: 2023-11-02
Payer: COMMERCIAL

## 2023-11-02 PROCEDURE — 99213 OFFICE O/P EST LOW 20 MIN: CPT | Mod: 25

## 2023-11-02 PROCEDURE — 36415 COLL VENOUS BLD VENIPUNCTURE: CPT

## 2023-11-09 ENCOUNTER — APPOINTMENT (OUTPATIENT)
Dept: HUMAN REPRODUCTION | Facility: CLINIC | Age: 43
End: 2023-11-09
Payer: COMMERCIAL

## 2023-11-09 PROCEDURE — 76857 US EXAM PELVIC LIMITED: CPT

## 2023-11-09 PROCEDURE — 99213 OFFICE O/P EST LOW 20 MIN: CPT | Mod: 25

## 2023-11-09 PROCEDURE — 36415 COLL VENOUS BLD VENIPUNCTURE: CPT

## 2023-11-29 ENCOUNTER — APPOINTMENT (OUTPATIENT)
Dept: HUMAN REPRODUCTION | Facility: CLINIC | Age: 43
End: 2023-11-29
Payer: COMMERCIAL

## 2023-11-29 PROCEDURE — 58999I: CUSTOM

## 2023-11-29 PROCEDURE — 58340 CATHETER FOR HYSTEROGRAPHY: CPT

## 2023-11-29 PROCEDURE — 76831 ECHO EXAM UTERUS: CPT

## 2023-12-18 ENCOUNTER — APPOINTMENT (OUTPATIENT)
Dept: GASTROENTEROLOGY | Facility: CLINIC | Age: 43
End: 2023-12-18
Payer: COMMERCIAL

## 2023-12-18 VITALS
SYSTOLIC BLOOD PRESSURE: 140 MMHG | BODY MASS INDEX: 29.23 KG/M2 | HEIGHT: 63 IN | DIASTOLIC BLOOD PRESSURE: 98 MMHG | WEIGHT: 165 LBS | HEART RATE: 87 BPM | OXYGEN SATURATION: 98 %

## 2023-12-18 DIAGNOSIS — K92.1 MELENA: ICD-10-CM

## 2023-12-18 DIAGNOSIS — K59.09 OTHER CONSTIPATION: ICD-10-CM

## 2023-12-18 PROCEDURE — 99213 OFFICE O/P EST LOW 20 MIN: CPT

## 2023-12-18 RX ORDER — SODIUM SULFATE, POTASSIUM SULFATE AND MAGNESIUM SULFATE 1.6; 3.13; 17.5 G/177ML; G/177ML; G/177ML
17.5-3.13-1.6 SOLUTION ORAL
Qty: 1 | Refills: 0 | Status: ACTIVE | COMMUNITY
Start: 2023-12-18 | End: 1900-01-01

## 2023-12-20 ENCOUNTER — APPOINTMENT (OUTPATIENT)
Dept: MAMMOGRAPHY | Facility: CLINIC | Age: 43
End: 2023-12-20
Payer: COMMERCIAL

## 2023-12-20 ENCOUNTER — APPOINTMENT (OUTPATIENT)
Dept: ULTRASOUND IMAGING | Facility: CLINIC | Age: 43
End: 2023-12-20

## 2023-12-20 ENCOUNTER — OUTPATIENT (OUTPATIENT)
Dept: OUTPATIENT SERVICES | Facility: HOSPITAL | Age: 43
LOS: 1 days | End: 2023-12-20
Payer: COMMERCIAL

## 2023-12-20 DIAGNOSIS — Z12.31 ENCOUNTER FOR SCREENING MAMMOGRAM FOR MALIGNANT NEOPLASM OF BREAST: ICD-10-CM

## 2023-12-20 DIAGNOSIS — Z98.890 OTHER SPECIFIED POSTPROCEDURAL STATES: Chronic | ICD-10-CM

## 2023-12-20 DIAGNOSIS — Z00.8 ENCOUNTER FOR OTHER GENERAL EXAMINATION: ICD-10-CM

## 2023-12-20 PROCEDURE — 76641 ULTRASOUND BREAST COMPLETE: CPT

## 2023-12-20 PROCEDURE — 77067 SCR MAMMO BI INCL CAD: CPT | Mod: 26

## 2023-12-20 PROCEDURE — 77063 BREAST TOMOSYNTHESIS BI: CPT | Mod: 26

## 2023-12-20 PROCEDURE — 77063 BREAST TOMOSYNTHESIS BI: CPT

## 2023-12-20 PROCEDURE — 77067 SCR MAMMO BI INCL CAD: CPT

## 2023-12-20 PROCEDURE — 76641 ULTRASOUND BREAST COMPLETE: CPT | Mod: 26,50

## 2023-12-21 ENCOUNTER — APPOINTMENT (OUTPATIENT)
Age: 43
End: 2023-12-21
Payer: COMMERCIAL

## 2023-12-21 PROBLEM — K59.09 CHRONIC CONSTIPATION: Status: ACTIVE | Noted: 2023-06-14

## 2023-12-21 PROBLEM — K92.1 HEMATOCHEZIA: Status: ACTIVE | Noted: 2023-06-14

## 2023-12-21 PROCEDURE — 45385 COLONOSCOPY W/LESION REMOVAL: CPT

## 2023-12-21 NOTE — ASSESSMENT
[FreeTextEntry1] : 43F, here with constipation and hematochezia. No prior egd/colon. +fhx of colon polyps (father) and colon cancer (maternal aunt, age 60s).  # Hematochezia  # Constipation  - Likely hematochezia 2/2 hemorrhoids in setting of constipation but ddx also includes mass, polyp, IBD - continue miralx bowel regimen and titrate as needed - increase dietary soluble fiber and hydration - recommend colonoscopy for intrlauminal evaluation. R/B/A reviewed  - prep sent to pharmacy on file   RTC after cscope

## 2023-12-21 NOTE — HISTORY OF PRESENT ILLNESS
[FreeTextEntry1] : Here today for f/u visit  Last seen June 2023   Recent miscarriage  May start fertility treatments soon   Feels well overall  Miralax helps with constipation  Continues to have intermittent red blood with wiping after bowel movement Denies abd pain, n/v, wt loss

## 2024-01-22 ENCOUNTER — NON-APPOINTMENT (OUTPATIENT)
Age: 44
End: 2024-01-22

## 2024-02-08 ENCOUNTER — APPOINTMENT (OUTPATIENT)
Dept: HUMAN REPRODUCTION | Facility: CLINIC | Age: 44
End: 2024-02-08
Payer: COMMERCIAL

## 2024-02-08 PROCEDURE — 99213 OFFICE O/P EST LOW 20 MIN: CPT | Mod: 25,NC

## 2024-02-08 PROCEDURE — 36415 COLL VENOUS BLD VENIPUNCTURE: CPT | Mod: NC

## 2024-02-08 PROCEDURE — 76857 US EXAM PELVIC LIMITED: CPT | Mod: NC

## 2024-02-15 ENCOUNTER — APPOINTMENT (OUTPATIENT)
Dept: HUMAN REPRODUCTION | Facility: CLINIC | Age: 44
End: 2024-02-15
Payer: COMMERCIAL

## 2024-02-15 PROCEDURE — 99213 OFFICE O/P EST LOW 20 MIN: CPT | Mod: 25,NC

## 2024-02-15 PROCEDURE — 36415 COLL VENOUS BLD VENIPUNCTURE: CPT | Mod: NC

## 2024-02-15 PROCEDURE — 76857 US EXAM PELVIC LIMITED: CPT

## 2024-02-21 NOTE — PACU DISCHARGE NOTE - NSCLINEINSERTRD_GEN_ALL_CORE
No Patient discharged to [facility].   Writer went over discharge plan with patient and daughter, both verbalized agreement to the plan.   Writer attempted to call [facility] to give report with no answer.  Patient discharged via [facility]

## 2024-02-26 ENCOUNTER — APPOINTMENT (OUTPATIENT)
Dept: HUMAN REPRODUCTION | Facility: CLINIC | Age: 44
End: 2024-02-26
Payer: COMMERCIAL

## 2024-02-26 PROCEDURE — 76857 US EXAM PELVIC LIMITED: CPT | Mod: NC

## 2024-02-26 PROCEDURE — 36415 COLL VENOUS BLD VENIPUNCTURE: CPT

## 2024-02-26 PROCEDURE — 99213 OFFICE O/P EST LOW 20 MIN: CPT | Mod: 25

## 2024-03-07 ENCOUNTER — APPOINTMENT (OUTPATIENT)
Dept: HUMAN REPRODUCTION | Facility: CLINIC | Age: 44
End: 2024-03-07
Payer: COMMERCIAL

## 2024-03-07 PROCEDURE — 76857 US EXAM PELVIC LIMITED: CPT | Mod: NC

## 2024-03-07 PROCEDURE — 36415 COLL VENOUS BLD VENIPUNCTURE: CPT

## 2024-03-07 PROCEDURE — 99213 OFFICE O/P EST LOW 20 MIN: CPT | Mod: 25

## 2024-03-14 ENCOUNTER — APPOINTMENT (OUTPATIENT)
Dept: HUMAN REPRODUCTION | Facility: CLINIC | Age: 44
End: 2024-03-14
Payer: COMMERCIAL

## 2024-03-14 PROCEDURE — 76857 US EXAM PELVIC LIMITED: CPT | Mod: NC

## 2024-03-14 PROCEDURE — 99213 OFFICE O/P EST LOW 20 MIN: CPT | Mod: 25

## 2024-03-14 PROCEDURE — 76857 US EXAM PELVIC LIMITED: CPT

## 2024-03-14 PROCEDURE — S4018P: CUSTOM

## 2024-03-15 ENCOUNTER — APPOINTMENT (OUTPATIENT)
Dept: HUMAN REPRODUCTION | Facility: CLINIC | Age: 44
End: 2024-03-15
Payer: COMMERCIAL

## 2024-03-15 PROCEDURE — ZZZZZ: CPT

## 2024-03-30 ENCOUNTER — APPOINTMENT (OUTPATIENT)
Dept: HUMAN REPRODUCTION | Facility: CLINIC | Age: 44
End: 2024-03-30
Payer: COMMERCIAL

## 2024-03-30 PROCEDURE — S4042: CPT | Mod: NC

## 2024-03-30 PROCEDURE — 99213 OFFICE O/P EST LOW 20 MIN: CPT | Mod: NC

## 2024-03-30 PROCEDURE — 36415 COLL VENOUS BLD VENIPUNCTURE: CPT | Mod: NC

## 2024-03-30 PROCEDURE — 76857 US EXAM PELVIC LIMITED: CPT | Mod: NC

## 2024-03-31 ENCOUNTER — APPOINTMENT (OUTPATIENT)
Dept: HUMAN REPRODUCTION | Facility: CLINIC | Age: 44
End: 2024-03-31
Payer: COMMERCIAL

## 2024-03-31 PROCEDURE — 36415 COLL VENOUS BLD VENIPUNCTURE: CPT | Mod: NC

## 2024-04-01 ENCOUNTER — APPOINTMENT (OUTPATIENT)
Dept: HUMAN REPRODUCTION | Facility: CLINIC | Age: 44
End: 2024-04-01
Payer: COMMERCIAL

## 2024-04-01 PROCEDURE — 99213 OFFICE O/P EST LOW 20 MIN: CPT | Mod: NC

## 2024-04-01 PROCEDURE — 76857 US EXAM PELVIC LIMITED: CPT | Mod: NC

## 2024-04-01 PROCEDURE — 36415 COLL VENOUS BLD VENIPUNCTURE: CPT | Mod: NC

## 2024-04-05 ENCOUNTER — APPOINTMENT (OUTPATIENT)
Dept: HUMAN REPRODUCTION | Facility: CLINIC | Age: 44
End: 2024-04-05
Payer: COMMERCIAL

## 2024-04-05 PROCEDURE — 99213 OFFICE O/P EST LOW 20 MIN: CPT | Mod: NC

## 2024-04-05 PROCEDURE — 36415 COLL VENOUS BLD VENIPUNCTURE: CPT | Mod: NC

## 2024-04-05 PROCEDURE — 76857 US EXAM PELVIC LIMITED: CPT | Mod: NC

## 2024-04-22 ENCOUNTER — APPOINTMENT (OUTPATIENT)
Dept: HUMAN REPRODUCTION | Facility: CLINIC | Age: 44
End: 2024-04-22
Payer: COMMERCIAL

## 2024-04-22 PROCEDURE — 76830 TRANSVAGINAL US NON-OB: CPT | Mod: NC

## 2024-04-22 PROCEDURE — 36415 COLL VENOUS BLD VENIPUNCTURE: CPT | Mod: NC

## 2024-04-22 PROCEDURE — S4042: CPT | Mod: NC

## 2024-04-22 PROCEDURE — 99213 OFFICE O/P EST LOW 20 MIN: CPT | Mod: NC

## 2024-04-30 ENCOUNTER — APPOINTMENT (OUTPATIENT)
Dept: HUMAN REPRODUCTION | Facility: CLINIC | Age: 44
End: 2024-04-30
Payer: COMMERCIAL

## 2024-04-30 PROCEDURE — 99213 OFFICE O/P EST LOW 20 MIN: CPT | Mod: NC

## 2024-04-30 PROCEDURE — 36415 COLL VENOUS BLD VENIPUNCTURE: CPT | Mod: NC

## 2024-04-30 PROCEDURE — 76857 US EXAM PELVIC LIMITED: CPT | Mod: NC

## 2024-05-03 ENCOUNTER — APPOINTMENT (OUTPATIENT)
Dept: HUMAN REPRODUCTION | Facility: CLINIC | Age: 44
End: 2024-05-03
Payer: COMMERCIAL

## 2024-05-03 PROCEDURE — 36415 COLL VENOUS BLD VENIPUNCTURE: CPT | Mod: NC

## 2024-05-03 PROCEDURE — 99213 OFFICE O/P EST LOW 20 MIN: CPT | Mod: NC

## 2024-05-03 PROCEDURE — 76857 US EXAM PELVIC LIMITED: CPT | Mod: NC

## 2024-05-09 ENCOUNTER — APPOINTMENT (OUTPATIENT)
Dept: HUMAN REPRODUCTION | Facility: CLINIC | Age: 44
End: 2024-05-09
Payer: COMMERCIAL

## 2024-05-09 PROCEDURE — 76857 US EXAM PELVIC LIMITED: CPT | Mod: NC

## 2024-05-09 PROCEDURE — 36415 COLL VENOUS BLD VENIPUNCTURE: CPT | Mod: NC

## 2024-05-09 PROCEDURE — 99213 OFFICE O/P EST LOW 20 MIN: CPT | Mod: NC

## 2024-05-09 PROCEDURE — S4018P: CUSTOM

## 2024-05-22 ENCOUNTER — APPOINTMENT (OUTPATIENT)
Dept: HUMAN REPRODUCTION | Facility: CLINIC | Age: 44
End: 2024-05-22
Payer: COMMERCIAL

## 2024-05-22 PROCEDURE — S4042: CPT | Mod: NC

## 2024-05-22 PROCEDURE — 36415 COLL VENOUS BLD VENIPUNCTURE: CPT | Mod: NC

## 2024-05-22 PROCEDURE — 99213 OFFICE O/P EST LOW 20 MIN: CPT | Mod: NC

## 2024-05-22 PROCEDURE — 76830 TRANSVAGINAL US NON-OB: CPT | Mod: NC

## 2024-05-30 ENCOUNTER — APPOINTMENT (OUTPATIENT)
Dept: HUMAN REPRODUCTION | Facility: CLINIC | Age: 44
End: 2024-05-30
Payer: COMMERCIAL

## 2024-05-30 PROCEDURE — 76857 US EXAM PELVIC LIMITED: CPT | Mod: NC

## 2024-05-30 PROCEDURE — 99213 OFFICE O/P EST LOW 20 MIN: CPT | Mod: NC

## 2024-05-30 PROCEDURE — 36415 COLL VENOUS BLD VENIPUNCTURE: CPT | Mod: NC

## 2024-05-31 ENCOUNTER — APPOINTMENT (OUTPATIENT)
Dept: HUMAN REPRODUCTION | Facility: CLINIC | Age: 44
End: 2024-05-31
Payer: COMMERCIAL

## 2024-05-31 PROCEDURE — 76857 US EXAM PELVIC LIMITED: CPT | Mod: NC

## 2024-05-31 PROCEDURE — 99213 OFFICE O/P EST LOW 20 MIN: CPT | Mod: NC

## 2024-05-31 PROCEDURE — 36415 COLL VENOUS BLD VENIPUNCTURE: CPT | Mod: NC

## 2024-06-01 ENCOUNTER — APPOINTMENT (OUTPATIENT)
Dept: HUMAN REPRODUCTION | Facility: CLINIC | Age: 44
End: 2024-06-01
Payer: COMMERCIAL

## 2024-06-01 PROCEDURE — 36415 COLL VENOUS BLD VENIPUNCTURE: CPT | Mod: NC

## 2024-06-01 PROCEDURE — 99213 OFFICE O/P EST LOW 20 MIN: CPT | Mod: NC

## 2024-06-01 PROCEDURE — 76857 US EXAM PELVIC LIMITED: CPT | Mod: NC

## 2024-06-02 ENCOUNTER — APPOINTMENT (OUTPATIENT)
Dept: HUMAN REPRODUCTION | Facility: CLINIC | Age: 44
End: 2024-06-02
Payer: COMMERCIAL

## 2024-06-02 PROCEDURE — 76857 US EXAM PELVIC LIMITED: CPT | Mod: NC

## 2024-06-02 PROCEDURE — 99213 OFFICE O/P EST LOW 20 MIN: CPT | Mod: NC

## 2024-06-02 PROCEDURE — S4018P: CUSTOM

## 2024-06-02 PROCEDURE — 36415 COLL VENOUS BLD VENIPUNCTURE: CPT | Mod: NC

## 2024-06-03 ENCOUNTER — APPOINTMENT (OUTPATIENT)
Dept: HUMAN REPRODUCTION | Facility: CLINIC | Age: 44
End: 2024-06-03

## 2024-06-21 ENCOUNTER — APPOINTMENT (OUTPATIENT)
Dept: HUMAN REPRODUCTION | Facility: CLINIC | Age: 44
End: 2024-06-21
Payer: COMMERCIAL

## 2024-06-21 PROCEDURE — 99213 OFFICE O/P EST LOW 20 MIN: CPT | Mod: NC,25

## 2024-06-21 PROCEDURE — 76830 TRANSVAGINAL US NON-OB: CPT | Mod: NC

## 2024-06-21 PROCEDURE — 36415 COLL VENOUS BLD VENIPUNCTURE: CPT | Mod: NC

## 2024-06-21 PROCEDURE — S4042: CPT | Mod: NC

## 2024-06-26 ENCOUNTER — APPOINTMENT (OUTPATIENT)
Dept: HUMAN REPRODUCTION | Facility: CLINIC | Age: 44
End: 2024-06-26
Payer: COMMERCIAL

## 2024-06-26 PROCEDURE — 36415 COLL VENOUS BLD VENIPUNCTURE: CPT | Mod: NC

## 2024-06-26 PROCEDURE — 99213 OFFICE O/P EST LOW 20 MIN: CPT | Mod: NC

## 2024-06-26 PROCEDURE — 76857 US EXAM PELVIC LIMITED: CPT | Mod: NC

## 2024-07-03 ENCOUNTER — APPOINTMENT (OUTPATIENT)
Dept: HUMAN REPRODUCTION | Facility: CLINIC | Age: 44
End: 2024-07-03
Payer: COMMERCIAL

## 2024-07-03 PROCEDURE — 36415 COLL VENOUS BLD VENIPUNCTURE: CPT | Mod: NC

## 2024-07-03 PROCEDURE — 76857 US EXAM PELVIC LIMITED: CPT | Mod: NC

## 2024-07-03 PROCEDURE — 99213 OFFICE O/P EST LOW 20 MIN: CPT | Mod: NC

## 2024-07-08 ENCOUNTER — APPOINTMENT (OUTPATIENT)
Dept: HUMAN REPRODUCTION | Facility: CLINIC | Age: 44
End: 2024-07-08
Payer: COMMERCIAL

## 2024-07-08 PROCEDURE — 76857 US EXAM PELVIC LIMITED: CPT | Mod: NC

## 2024-07-08 PROCEDURE — 36415 COLL VENOUS BLD VENIPUNCTURE: CPT | Mod: NC

## 2024-07-08 PROCEDURE — S4018P: CUSTOM

## 2024-07-08 PROCEDURE — 99213 OFFICE O/P EST LOW 20 MIN: CPT | Mod: NC

## 2024-07-30 ENCOUNTER — APPOINTMENT (OUTPATIENT)
Dept: HUMAN REPRODUCTION | Facility: CLINIC | Age: 44
End: 2024-07-30
Payer: COMMERCIAL

## 2024-07-30 PROCEDURE — 36415 COLL VENOUS BLD VENIPUNCTURE: CPT | Mod: NC

## 2024-07-30 PROCEDURE — 99213 OFFICE O/P EST LOW 20 MIN: CPT | Mod: NC

## 2024-07-30 PROCEDURE — 76857 US EXAM PELVIC LIMITED: CPT | Mod: NC

## 2024-08-01 ENCOUNTER — APPOINTMENT (OUTPATIENT)
Dept: HUMAN REPRODUCTION | Facility: CLINIC | Age: 44
End: 2024-08-01
Payer: COMMERCIAL

## 2024-08-01 PROCEDURE — 58559 HYSTEROSCOPY LYSIS: CPT

## 2024-08-01 PROCEDURE — 76998 US GUIDE INTRAOP: CPT

## 2024-08-15 ENCOUNTER — APPOINTMENT (OUTPATIENT)
Dept: HUMAN REPRODUCTION | Facility: CLINIC | Age: 44
End: 2024-08-15
Payer: COMMERCIAL

## 2024-08-15 PROCEDURE — 36415 COLL VENOUS BLD VENIPUNCTURE: CPT

## 2024-08-15 PROCEDURE — 76857 US EXAM PELVIC LIMITED: CPT

## 2024-08-15 PROCEDURE — 99213 OFFICE O/P EST LOW 20 MIN: CPT | Mod: 25

## 2024-08-22 ENCOUNTER — APPOINTMENT (OUTPATIENT)
Dept: HUMAN REPRODUCTION | Facility: CLINIC | Age: 44
End: 2024-08-22

## 2024-08-22 PROCEDURE — 36415 COLL VENOUS BLD VENIPUNCTURE: CPT | Mod: NC

## 2024-08-22 PROCEDURE — 76857 US EXAM PELVIC LIMITED: CPT | Mod: NC

## 2024-08-22 PROCEDURE — 99213 OFFICE O/P EST LOW 20 MIN: CPT | Mod: NC

## 2024-08-22 PROCEDURE — 99459 PELVIC EXAMINATION: CPT | Mod: NC

## 2024-08-22 PROCEDURE — 76830P: CUSTOM

## 2024-09-04 ENCOUNTER — APPOINTMENT (OUTPATIENT)
Dept: HUMAN REPRODUCTION | Facility: CLINIC | Age: 44
End: 2024-09-04
Payer: COMMERCIAL

## 2024-09-04 PROCEDURE — 36415 COLL VENOUS BLD VENIPUNCTURE: CPT | Mod: NC

## 2024-09-04 PROCEDURE — 99213 OFFICE O/P EST LOW 20 MIN: CPT | Mod: NC

## 2024-09-04 PROCEDURE — S4042: CPT | Mod: NC

## 2024-09-04 PROCEDURE — 76830 TRANSVAGINAL US NON-OB: CPT | Mod: NC

## 2024-09-09 ENCOUNTER — APPOINTMENT (OUTPATIENT)
Dept: HUMAN REPRODUCTION | Facility: CLINIC | Age: 44
End: 2024-09-09
Payer: COMMERCIAL

## 2024-09-09 PROCEDURE — 36415 COLL VENOUS BLD VENIPUNCTURE: CPT | Mod: NC

## 2024-09-10 ENCOUNTER — APPOINTMENT (OUTPATIENT)
Dept: HUMAN REPRODUCTION | Facility: CLINIC | Age: 44
End: 2024-09-10
Payer: COMMERCIAL

## 2024-09-10 PROCEDURE — 76831 ECHO EXAM UTERUS: CPT | Mod: NC

## 2024-09-10 PROCEDURE — 58974 EMBRYO TRANSFER INTRAUTERINE: CPT | Mod: 52

## 2024-09-10 PROCEDURE — 76831P: CUSTOM

## 2024-09-19 ENCOUNTER — APPOINTMENT (OUTPATIENT)
Dept: HUMAN REPRODUCTION | Facility: CLINIC | Age: 44
End: 2024-09-19
Payer: COMMERCIAL

## 2024-09-19 PROCEDURE — 99213 OFFICE O/P EST LOW 20 MIN: CPT | Mod: NC

## 2024-09-19 PROCEDURE — S4042: CPT | Mod: NC

## 2024-09-19 PROCEDURE — 36415 COLL VENOUS BLD VENIPUNCTURE: CPT | Mod: NC

## 2024-09-19 PROCEDURE — 76830 TRANSVAGINAL US NON-OB: CPT | Mod: NC

## 2024-09-26 ENCOUNTER — APPOINTMENT (OUTPATIENT)
Dept: HUMAN REPRODUCTION | Facility: CLINIC | Age: 44
End: 2024-09-26
Payer: COMMERCIAL

## 2024-09-26 PROCEDURE — 36415 COLL VENOUS BLD VENIPUNCTURE: CPT | Mod: NC

## 2024-09-26 PROCEDURE — 99459 PELVIC EXAMINATION: CPT | Mod: NC

## 2024-09-26 PROCEDURE — 76857 US EXAM PELVIC LIMITED: CPT | Mod: NC

## 2024-09-26 PROCEDURE — 99213 OFFICE O/P EST LOW 20 MIN: CPT | Mod: NC

## 2024-09-30 ENCOUNTER — APPOINTMENT (OUTPATIENT)
Dept: HUMAN REPRODUCTION | Facility: CLINIC | Age: 44
End: 2024-09-30
Payer: COMMERCIAL

## 2024-09-30 PROCEDURE — 99213 OFFICE O/P EST LOW 20 MIN: CPT | Mod: NC

## 2024-09-30 PROCEDURE — 99459 PELVIC EXAMINATION: CPT | Mod: NC

## 2024-09-30 PROCEDURE — 36415 COLL VENOUS BLD VENIPUNCTURE: CPT | Mod: NC

## 2024-09-30 PROCEDURE — 76857 US EXAM PELVIC LIMITED: CPT | Mod: NC

## 2024-10-03 ENCOUNTER — APPOINTMENT (OUTPATIENT)
Dept: HUMAN REPRODUCTION | Facility: CLINIC | Age: 44
End: 2024-10-03
Payer: COMMERCIAL

## 2024-10-03 PROCEDURE — 36415 COLL VENOUS BLD VENIPUNCTURE: CPT | Mod: NC

## 2024-10-03 PROCEDURE — 76857 US EXAM PELVIC LIMITED: CPT | Mod: NC

## 2024-10-03 PROCEDURE — 99213 OFFICE O/P EST LOW 20 MIN: CPT | Mod: NC

## 2024-10-04 ENCOUNTER — APPOINTMENT (OUTPATIENT)
Dept: HUMAN REPRODUCTION | Facility: CLINIC | Age: 44
End: 2024-10-04
Payer: COMMERCIAL

## 2024-10-04 PROCEDURE — 76857 US EXAM PELVIC LIMITED: CPT | Mod: NC

## 2024-10-04 PROCEDURE — 36415 COLL VENOUS BLD VENIPUNCTURE: CPT | Mod: NC

## 2024-10-04 PROCEDURE — 99213 OFFICE O/P EST LOW 20 MIN: CPT | Mod: NC

## 2024-10-07 ENCOUNTER — APPOINTMENT (OUTPATIENT)
Dept: HUMAN REPRODUCTION | Facility: CLINIC | Age: 44
End: 2024-10-07
Payer: COMMERCIAL

## 2024-10-07 PROCEDURE — 99459 PELVIC EXAMINATION: CPT | Mod: NC

## 2024-10-07 PROCEDURE — 36415 COLL VENOUS BLD VENIPUNCTURE: CPT | Mod: NC

## 2024-10-07 PROCEDURE — 76857 US EXAM PELVIC LIMITED: CPT | Mod: NC

## 2024-10-07 PROCEDURE — 99213 OFFICE O/P EST LOW 20 MIN: CPT | Mod: NC

## 2024-10-08 ENCOUNTER — APPOINTMENT (OUTPATIENT)
Dept: HUMAN REPRODUCTION | Facility: CLINIC | Age: 44
End: 2024-10-08
Payer: COMMERCIAL

## 2024-10-08 PROCEDURE — 76857 US EXAM PELVIC LIMITED: CPT | Mod: NC

## 2024-10-08 PROCEDURE — 99213 OFFICE O/P EST LOW 20 MIN: CPT | Mod: NC

## 2024-10-08 PROCEDURE — 99459 PELVIC EXAMINATION: CPT | Mod: NC

## 2024-10-08 PROCEDURE — 36415 COLL VENOUS BLD VENIPUNCTURE: CPT | Mod: NC

## 2024-10-09 ENCOUNTER — APPOINTMENT (OUTPATIENT)
Dept: HUMAN REPRODUCTION | Facility: CLINIC | Age: 44
End: 2024-10-09
Payer: COMMERCIAL

## 2024-10-09 PROCEDURE — 99459 PELVIC EXAMINATION: CPT | Mod: NC

## 2024-10-09 PROCEDURE — S4018P: CUSTOM

## 2024-10-09 PROCEDURE — 76857 US EXAM PELVIC LIMITED: CPT | Mod: NC

## 2024-10-09 PROCEDURE — 99213 OFFICE O/P EST LOW 20 MIN: CPT | Mod: NC

## 2024-10-09 PROCEDURE — 36415 COLL VENOUS BLD VENIPUNCTURE: CPT | Mod: NC

## 2024-11-26 NOTE — H&P PST ADULT - CLICK TO LAUNCH ORM
Patient has some noted/documented hx of cognitive impairment though no clear diagnosis of dementia  On exam is Aox2-3 and seems to be a fair historian though with some forgetfulness evident  Given baseline functional status (family helps with/manages her medications and finances) this is likely consistent with at least a mild dementia, though would hold off from making formal diagnosis while patient is not presently at baseline in setting of recent hospitalizations and acute illness  Was reportedly acutely worsened in the last 1-2 weeks but more recently improving back to apparent baseline. May have been related to overdiuresis/dryness. Also appears she was previously on lactulose for hepatic encephalopathy in September 2024 but has been off lactulose since most recent hospitalization at [facility] - see Hyperammonemia  She does appear able to intelligently discuss her medical conditions and make related decisions at this time  Supportive care  Monitor mentation  Delirium precautions  Follow up with PCP, consider outpatient cognitive testing as appropriate   .

## 2024-11-27 ENCOUNTER — APPOINTMENT (OUTPATIENT)
Dept: HUMAN REPRODUCTION | Facility: CLINIC | Age: 44
End: 2024-11-27
Payer: COMMERCIAL

## 2024-11-27 PROCEDURE — 99215 OFFICE O/P EST HI 40 MIN: CPT | Mod: NC

## 2024-11-27 PROCEDURE — 99214P: CUSTOM

## 2024-12-05 ENCOUNTER — APPOINTMENT (OUTPATIENT)
Dept: HUMAN REPRODUCTION | Facility: CLINIC | Age: 44
End: 2024-12-05
Payer: COMMERCIAL

## 2024-12-05 PROCEDURE — 99459 PELVIC EXAMINATION: CPT | Mod: NC

## 2024-12-05 PROCEDURE — 99213 OFFICE O/P EST LOW 20 MIN: CPT | Mod: NC

## 2024-12-05 PROCEDURE — 76857 US EXAM PELVIC LIMITED: CPT | Mod: NC

## 2024-12-05 PROCEDURE — 36415 COLL VENOUS BLD VENIPUNCTURE: CPT | Mod: NC

## 2024-12-06 ENCOUNTER — APPOINTMENT (OUTPATIENT)
Dept: MAMMOGRAPHY | Facility: IMAGING CENTER | Age: 44
End: 2024-12-06
Payer: COMMERCIAL

## 2024-12-06 ENCOUNTER — APPOINTMENT (OUTPATIENT)
Dept: HUMAN REPRODUCTION | Facility: CLINIC | Age: 44
End: 2024-12-06
Payer: COMMERCIAL

## 2024-12-06 ENCOUNTER — OUTPATIENT (OUTPATIENT)
Dept: OUTPATIENT SERVICES | Facility: HOSPITAL | Age: 44
LOS: 1 days | End: 2024-12-06
Payer: COMMERCIAL

## 2024-12-06 ENCOUNTER — APPOINTMENT (OUTPATIENT)
Dept: ULTRASOUND IMAGING | Facility: IMAGING CENTER | Age: 44
End: 2024-12-06
Payer: COMMERCIAL

## 2024-12-06 DIAGNOSIS — Z00.8 ENCOUNTER FOR OTHER GENERAL EXAMINATION: ICD-10-CM

## 2024-12-06 PROCEDURE — 36415 COLL VENOUS BLD VENIPUNCTURE: CPT | Mod: NC

## 2024-12-06 PROCEDURE — 76641 ULTRASOUND BREAST COMPLETE: CPT | Mod: 26,50

## 2024-12-06 PROCEDURE — 76641 ULTRASOUND BREAST COMPLETE: CPT

## 2024-12-06 PROCEDURE — 77067 SCR MAMMO BI INCL CAD: CPT

## 2024-12-06 PROCEDURE — 77067 SCR MAMMO BI INCL CAD: CPT | Mod: 26

## 2024-12-06 PROCEDURE — 77063 BREAST TOMOSYNTHESIS BI: CPT

## 2024-12-06 PROCEDURE — 77063 BREAST TOMOSYNTHESIS BI: CPT | Mod: 26

## 2024-12-09 ENCOUNTER — APPOINTMENT (OUTPATIENT)
Dept: INTERNAL MEDICINE | Facility: CLINIC | Age: 44
End: 2024-12-09
Payer: COMMERCIAL

## 2024-12-09 VITALS
WEIGHT: 166 LBS | HEIGHT: 63 IN | SYSTOLIC BLOOD PRESSURE: 136 MMHG | OXYGEN SATURATION: 97 % | RESPIRATION RATE: 14 BRPM | DIASTOLIC BLOOD PRESSURE: 84 MMHG | BODY MASS INDEX: 29.41 KG/M2 | TEMPERATURE: 98.1 F | HEART RATE: 76 BPM

## 2024-12-09 DIAGNOSIS — Z80.8 FAMILY HISTORY OF MALIGNANT NEOPLASM OF OTHER ORGANS OR SYSTEMS: ICD-10-CM

## 2024-12-09 DIAGNOSIS — Z00.00 ENCOUNTER FOR GENERAL ADULT MEDICAL EXAMINATION W/OUT ABNORMAL FINDINGS: ICD-10-CM

## 2024-12-09 DIAGNOSIS — R73.03 PREDIABETES.: ICD-10-CM

## 2024-12-09 PROCEDURE — 99396 PREV VISIT EST AGE 40-64: CPT

## 2024-12-09 RX ORDER — METFORMIN HYDROCHLORIDE 500 MG/1
500 TABLET, COATED ORAL
Refills: 5 | Status: ACTIVE | COMMUNITY
Start: 2024-12-09

## 2024-12-09 RX ORDER — NIFEDIPINE 30 MG/1
30 TABLET, FILM COATED, EXTENDED RELEASE ORAL
Refills: 0 | Status: ACTIVE | COMMUNITY
Start: 2024-12-09

## 2024-12-10 DIAGNOSIS — I10 ESSENTIAL (PRIMARY) HYPERTENSION: ICD-10-CM

## 2024-12-10 LAB
25(OH)D3 SERPL-MCNC: 18.4 NG/ML
ALBUMIN SERPL ELPH-MCNC: 4.3 G/DL
ALP BLD-CCNC: 70 U/L
ALT SERPL-CCNC: 16 U/L
ANION GAP SERPL CALC-SCNC: 13 MMOL/L
AST SERPL-CCNC: 15 U/L
BASOPHILS # BLD AUTO: 0.05 K/UL
BASOPHILS NFR BLD AUTO: 0.4 %
BILIRUB SERPL-MCNC: 0.3 MG/DL
BUN SERPL-MCNC: 7 MG/DL
CALCIUM SERPL-MCNC: 9.7 MG/DL
CHLORIDE SERPL-SCNC: 101 MMOL/L
CHOLEST SERPL-MCNC: 193 MG/DL
CO2 SERPL-SCNC: 25 MMOL/L
CREAT SERPL-MCNC: 0.59 MG/DL
EGFR: 115 ML/MIN/1.73M2
EOSINOPHIL # BLD AUTO: 0.64 K/UL
EOSINOPHIL NFR BLD AUTO: 5.6 %
ESTIMATED AVERAGE GLUCOSE: 117 MG/DL
FOLATE SERPL-MCNC: 10.8 NG/ML
GLUCOSE SERPL-MCNC: 115 MG/DL
HAV IGM SER QL: NONREACTIVE
HBA1C MFR BLD HPLC: 5.7 %
HBV CORE IGM SER QL: NONREACTIVE
HBV SURFACE AG SER QL: NONREACTIVE
HCT VFR BLD CALC: 42.9 %
HCV AB SER QL: NONREACTIVE
HCV S/CO RATIO: 0.12 S/CO
HDLC SERPL-MCNC: 56 MG/DL
HGB BLD-MCNC: 13.3 G/DL
IMM GRANULOCYTES NFR BLD AUTO: 0.6 %
LDLC SERPL CALC-MCNC: 121 MG/DL
LYMPHOCYTES # BLD AUTO: 2.78 K/UL
LYMPHOCYTES NFR BLD AUTO: 24.5 %
MAN DIFF?: NORMAL
MCHC RBC-ENTMCNC: 26.7 PG
MCHC RBC-ENTMCNC: 31 G/DL
MCV RBC AUTO: 86.1 FL
MONOCYTES # BLD AUTO: 0.57 K/UL
MONOCYTES NFR BLD AUTO: 5 %
NEUTROPHILS # BLD AUTO: 7.23 K/UL
NEUTROPHILS NFR BLD AUTO: 63.9 %
NONHDLC SERPL-MCNC: 137 MG/DL
PLATELET # BLD AUTO: 288 K/UL
POTASSIUM SERPL-SCNC: 4.1 MMOL/L
PROT SERPL-MCNC: 7.7 G/DL
RBC # BLD: 4.98 M/UL
RBC # FLD: 15.1 %
SODIUM SERPL-SCNC: 139 MMOL/L
TRIGL SERPL-MCNC: 88 MG/DL
TSH SERPL-ACNC: 1.67 UIU/ML
VIT B12 SERPL-MCNC: 696 PG/ML
WBC # FLD AUTO: 11.34 K/UL

## 2025-01-02 ENCOUNTER — APPOINTMENT (OUTPATIENT)
Dept: HUMAN REPRODUCTION | Facility: CLINIC | Age: 45
End: 2025-01-02
Payer: COMMERCIAL

## 2025-01-02 PROCEDURE — 36415 COLL VENOUS BLD VENIPUNCTURE: CPT | Mod: NC

## 2025-01-03 ENCOUNTER — APPOINTMENT (OUTPATIENT)
Dept: HUMAN REPRODUCTION | Facility: CLINIC | Age: 45
End: 2025-01-03
Payer: COMMERCIAL

## 2025-01-03 ENCOUNTER — APPOINTMENT (OUTPATIENT)
Dept: HUMAN REPRODUCTION | Facility: CLINIC | Age: 45
End: 2025-01-03

## 2025-01-03 PROCEDURE — 96372 THER/PROPH/DIAG INJ SC/IM: CPT | Mod: NC

## 2025-01-06 ENCOUNTER — APPOINTMENT (OUTPATIENT)
Dept: HUMAN REPRODUCTION | Facility: CLINIC | Age: 45
End: 2025-01-06

## 2025-01-29 ENCOUNTER — APPOINTMENT (OUTPATIENT)
Dept: HUMAN REPRODUCTION | Facility: CLINIC | Age: 45
End: 2025-01-29
Payer: COMMERCIAL

## 2025-01-29 PROCEDURE — S4042: CPT | Mod: NC

## 2025-01-29 PROCEDURE — 36415 COLL VENOUS BLD VENIPUNCTURE: CPT | Mod: NC

## 2025-01-29 PROCEDURE — 99213 OFFICE O/P EST LOW 20 MIN: CPT | Mod: NC

## 2025-01-29 PROCEDURE — 76830 TRANSVAGINAL US NON-OB: CPT | Mod: NC

## 2025-02-07 ENCOUNTER — APPOINTMENT (OUTPATIENT)
Dept: HUMAN REPRODUCTION | Facility: CLINIC | Age: 45
End: 2025-02-07
Payer: COMMERCIAL

## 2025-02-07 PROCEDURE — 99213 OFFICE O/P EST LOW 20 MIN: CPT | Mod: NC

## 2025-02-07 PROCEDURE — 36415 COLL VENOUS BLD VENIPUNCTURE: CPT | Mod: NC

## 2025-02-07 PROCEDURE — 76857 US EXAM PELVIC LIMITED: CPT | Mod: NC

## 2025-02-13 ENCOUNTER — APPOINTMENT (OUTPATIENT)
Dept: HUMAN REPRODUCTION | Facility: CLINIC | Age: 45
End: 2025-02-13
Payer: COMMERCIAL

## 2025-02-13 PROCEDURE — 99213 OFFICE O/P EST LOW 20 MIN: CPT | Mod: NC

## 2025-02-13 PROCEDURE — 36415 COLL VENOUS BLD VENIPUNCTURE: CPT | Mod: NC

## 2025-02-13 PROCEDURE — 76857 US EXAM PELVIC LIMITED: CPT | Mod: NC

## 2025-02-14 ENCOUNTER — APPOINTMENT (OUTPATIENT)
Dept: HUMAN REPRODUCTION | Facility: CLINIC | Age: 45
End: 2025-02-14
Payer: COMMERCIAL

## 2025-02-14 PROCEDURE — 99213 OFFICE O/P EST LOW 20 MIN: CPT | Mod: NC

## 2025-02-14 PROCEDURE — 76857 US EXAM PELVIC LIMITED: CPT | Mod: NC

## 2025-02-18 ENCOUNTER — APPOINTMENT (OUTPATIENT)
Dept: HUMAN REPRODUCTION | Facility: CLINIC | Age: 45
End: 2025-02-18
Payer: COMMERCIAL

## 2025-02-18 PROCEDURE — 36415 COLL VENOUS BLD VENIPUNCTURE: CPT | Mod: NC

## 2025-02-18 PROCEDURE — 99213 OFFICE O/P EST LOW 20 MIN: CPT | Mod: NC

## 2025-02-18 PROCEDURE — 76857 US EXAM PELVIC LIMITED: CPT | Mod: NC

## 2025-03-04 ENCOUNTER — EMERGENCY (EMERGENCY)
Facility: HOSPITAL | Age: 45
LOS: 1 days | Discharge: HOME | End: 2025-03-04
Attending: EMERGENCY MEDICINE | Admitting: STUDENT IN AN ORGANIZED HEALTH CARE EDUCATION/TRAINING PROGRAM
Payer: COMMERCIAL

## 2025-03-04 VITALS
TEMPERATURE: 98 F | SYSTOLIC BLOOD PRESSURE: 131 MMHG | DIASTOLIC BLOOD PRESSURE: 84 MMHG | HEART RATE: 81 BPM | RESPIRATION RATE: 17 BRPM | OXYGEN SATURATION: 98 %

## 2025-03-04 VITALS
SYSTOLIC BLOOD PRESSURE: 140 MMHG | HEIGHT: 62 IN | WEIGHT: 164.02 LBS | TEMPERATURE: 98 F | HEART RATE: 79 BPM | DIASTOLIC BLOOD PRESSURE: 88 MMHG | OXYGEN SATURATION: 97 % | RESPIRATION RATE: 18 BRPM

## 2025-03-04 DIAGNOSIS — Z98.890 OTHER SPECIFIED POSTPROCEDURAL STATES: Chronic | ICD-10-CM

## 2025-03-04 LAB
ALBUMIN SERPL ELPH-MCNC: 3.3 G/DL — SIGNIFICANT CHANGE UP (ref 3.3–5)
ALP SERPL-CCNC: 52 U/L — SIGNIFICANT CHANGE UP (ref 40–120)
ALT FLD-CCNC: 26 U/L — SIGNIFICANT CHANGE UP (ref 12–78)
ANION GAP SERPL CALC-SCNC: 4 MMOL/L — LOW (ref 5–17)
AST SERPL-CCNC: 15 U/L — SIGNIFICANT CHANGE UP (ref 15–37)
BASOPHILS # BLD AUTO: 0 K/UL — SIGNIFICANT CHANGE UP (ref 0–0.2)
BASOPHILS NFR BLD AUTO: 0 % — SIGNIFICANT CHANGE UP (ref 0–2)
BILIRUB SERPL-MCNC: 0.2 MG/DL — SIGNIFICANT CHANGE UP (ref 0.2–1.2)
BUN SERPL-MCNC: 11 MG/DL — SIGNIFICANT CHANGE UP (ref 7–23)
CALCIUM SERPL-MCNC: 8.5 MG/DL — SIGNIFICANT CHANGE UP (ref 8.5–10.1)
CHLORIDE SERPL-SCNC: 106 MMOL/L — SIGNIFICANT CHANGE UP (ref 96–108)
CO2 SERPL-SCNC: 29 MMOL/L — SIGNIFICANT CHANGE UP (ref 22–31)
CREAT SERPL-MCNC: 0.53 MG/DL — SIGNIFICANT CHANGE UP (ref 0.5–1.3)
EGFR: 118 ML/MIN/1.73M2 — SIGNIFICANT CHANGE UP
EOSINOPHIL # BLD AUTO: 0.11 K/UL — SIGNIFICANT CHANGE UP (ref 0–0.5)
EOSINOPHIL NFR BLD AUTO: 2 % — SIGNIFICANT CHANGE UP (ref 0–6)
FLUAV AG NPH QL: SIGNIFICANT CHANGE UP
FLUBV AG NPH QL: SIGNIFICANT CHANGE UP
GLUCOSE SERPL-MCNC: 118 MG/DL — HIGH (ref 70–99)
HCG SERPL-ACNC: <1 MIU/ML — SIGNIFICANT CHANGE UP
HCT VFR BLD CALC: 40.1 % — SIGNIFICANT CHANGE UP (ref 34.5–45)
HGB BLD-MCNC: 13 G/DL — SIGNIFICANT CHANGE UP (ref 11.5–15.5)
LYMPHOCYTES # BLD AUTO: 1.19 K/UL — SIGNIFICANT CHANGE UP (ref 1–3.3)
LYMPHOCYTES # BLD AUTO: 21 % — SIGNIFICANT CHANGE UP (ref 13–44)
MCHC RBC-ENTMCNC: 26.6 PG — LOW (ref 27–34)
MCHC RBC-ENTMCNC: 32.4 G/DL — SIGNIFICANT CHANGE UP (ref 32–36)
MCV RBC AUTO: 82.2 FL — SIGNIFICANT CHANGE UP (ref 80–100)
MONOCYTES # BLD AUTO: 0.4 K/UL — SIGNIFICANT CHANGE UP (ref 0–0.9)
MONOCYTES NFR BLD AUTO: 7 % — SIGNIFICANT CHANGE UP (ref 2–14)
NEUTROPHILS # BLD AUTO: 3.5 K/UL — SIGNIFICANT CHANGE UP (ref 1.8–7.4)
NEUTROPHILS NFR BLD AUTO: 62 % — SIGNIFICANT CHANGE UP (ref 43–77)
NRBC BLD AUTO-RTO: SIGNIFICANT CHANGE UP /100 WBCS (ref 0–0)
PLATELET # BLD AUTO: 222 K/UL — SIGNIFICANT CHANGE UP (ref 150–400)
POTASSIUM SERPL-MCNC: 3.8 MMOL/L — SIGNIFICANT CHANGE UP (ref 3.5–5.3)
POTASSIUM SERPL-SCNC: 3.8 MMOL/L — SIGNIFICANT CHANGE UP (ref 3.5–5.3)
PROT SERPL-MCNC: 7.2 G/DL — SIGNIFICANT CHANGE UP (ref 6–8.3)
RBC # BLD: 4.88 M/UL — SIGNIFICANT CHANGE UP (ref 3.8–5.2)
RBC # FLD: 15 % — HIGH (ref 10.3–14.5)
RSV RNA NPH QL NAA+NON-PROBE: SIGNIFICANT CHANGE UP
S PYO DNA THROAT QL NAA+PROBE: SIGNIFICANT CHANGE UP
SARS-COV-2 RNA SPEC QL NAA+PROBE: SIGNIFICANT CHANGE UP
SODIUM SERPL-SCNC: 139 MMOL/L — SIGNIFICANT CHANGE UP (ref 135–145)
WBC # BLD: 5.65 K/UL — SIGNIFICANT CHANGE UP (ref 3.8–10.5)
WBC # FLD AUTO: 5.65 K/UL — SIGNIFICANT CHANGE UP (ref 3.8–10.5)

## 2025-03-04 PROCEDURE — 96360 HYDRATION IV INFUSION INIT: CPT | Mod: XU

## 2025-03-04 PROCEDURE — 87651 STREP A DNA AMP PROBE: CPT

## 2025-03-04 PROCEDURE — 70491 CT SOFT TISSUE NECK W/DYE: CPT | Mod: MC

## 2025-03-04 PROCEDURE — 87798 DETECT AGENT NOS DNA AMP: CPT

## 2025-03-04 PROCEDURE — 70480 CT ORBIT/EAR/FOSSA W/O DYE: CPT | Mod: 26

## 2025-03-04 PROCEDURE — 80053 COMPREHEN METABOLIC PANEL: CPT

## 2025-03-04 PROCEDURE — 87637 SARSCOV2&INF A&B&RSV AMP PRB: CPT

## 2025-03-04 PROCEDURE — 36415 COLL VENOUS BLD VENIPUNCTURE: CPT

## 2025-03-04 PROCEDURE — 99284 EMERGENCY DEPT VISIT MOD MDM: CPT | Mod: 25

## 2025-03-04 PROCEDURE — 84702 CHORIONIC GONADOTROPIN TEST: CPT

## 2025-03-04 PROCEDURE — 99285 EMERGENCY DEPT VISIT HI MDM: CPT

## 2025-03-04 PROCEDURE — 70491 CT SOFT TISSUE NECK W/DYE: CPT | Mod: 26

## 2025-03-04 PROCEDURE — 85025 COMPLETE CBC W/AUTO DIFF WBC: CPT

## 2025-03-04 PROCEDURE — 70480 CT ORBIT/EAR/FOSSA W/O DYE: CPT | Mod: MC

## 2025-03-04 RX ORDER — AMOXICILLIN AND CLAVULANATE POTASSIUM 500; 125 MG/1; MG/1
875 TABLET, FILM COATED ORAL
Qty: 14 | Refills: 0
Start: 2025-03-04 | End: 2025-03-10

## 2025-03-04 RX ORDER — KETOROLAC TROMETHAMINE 30 MG/ML
15 INJECTION, SOLUTION INTRAMUSCULAR; INTRAVENOUS ONCE
Refills: 0 | Status: COMPLETED | OUTPATIENT
Start: 2025-03-04 | End: 2025-03-04

## 2025-03-04 RX ADMIN — Medication 1000 MILLILITER(S): at 16:30

## 2025-03-04 RX ADMIN — Medication 1000 MILLILITER(S): at 15:24

## 2025-03-04 NOTE — ED PROVIDER NOTE - ENMT, MLM
Airway patent, Nasal mucosa clear. Mouth with normal mucosa. Throat has no vesicles, no oropharyngeal exudates and uvula is midline. b/l em wnl ttp left mastoid area + reactive lymph node?

## 2025-03-04 NOTE — ED ADULT NURSE NOTE - OBJECTIVE STATEMENT
patient presents to ED with complaints of pain behind the left ear.  patient states she went to urgent care this morning and was told she may have mastoiditis and to come to the ED.  behind the left ear is very tender to touch and slightly red.  patient was sick about a week ago ( possible flu) but is feeling better now.

## 2025-03-04 NOTE — ED PROVIDER NOTE - PATIENT PORTAL LINK FT
You can access the FollowMyHealth Patient Portal offered by [facility] by registering at the following website: [email or web address]. By joining [employer]’s FollowMyHealth portal, you will also be able to view your health information using other applications (apps) compatible with our system.

## 2025-03-04 NOTE — ED PROVIDER NOTE - CARE PROVIDER_API CALL
6
Nate Calles  Otolaryngology  [address] [facility]  [place] [address]  Phone: [phone]  Fax: [phone]  Follow Up Time:

## 2025-03-04 NOTE — ED PROVIDER NOTE - ATTENDING APP SHARED VISIT CONTRIBUTION OF CARE
Patient is a 44-year-old female is currently undergoing fertility treatments in an effort to become pregnant.  She is not pregnant at this time.  Recently had URI symptoms with pain behind both ears sore throat.  The pain behind her right ear got better but there is tenderness and swelling behind her left ear.  Out of an abundance of concern she went to urgent care today and was told to come to the emergency room for care and evaluation to rule out mastoiditis or other pathology due to the swelling and pain behind her ear.  She denies otologic discharge she denies fever or chills she denies headache she denies rhinorrhea she denies cough or sore throat.  She denies stiff neck or trismus.  She denies any chest pain or back pain.  She denies any stomach pain.  She denies any rash.  She denies any musculoskeletal symptoms.  Denies any trauma or travel.  She has no loss of hearing or change in phonation or taste.    On evaluation is a well-developed well-nourished female no apparent distress.  HEENT demonstrates small effusion behind the left TM with canal erythema but there is no evidence of mastoiditis the mastoid itself is nontender to percussion.  Patient has a tender reactive lymph node behind the posterior auricular chain.  And this also tracks down to the posterior sternocleidomastoid.  The neck is supple.  There is no TMJ pain.  The oropharynx reveals mild posterior cobblestoning with postnasal drip.  There is no exudate or erythema.  The right TM is normal.  Cardiopulmonary examination is normal.  Abdomen soft and nontender without guarding rebound.  Musculoskeletal lamination is normal.  Skin examination is normal.  Neurologic examination is normal.  Patient has no other lymphadenopathy in her axilla or inguinal regions.  There is no significant other cervical adenopathy aside from the left cervical posterior chain.  And the posterior regular chain.    Plan of care includes patient was sent from urgent care for rule out mastoiditis versus reactive lymphadenopathy.  Will obtain flu/COVID testing, RVP testing strep testing with a swab and PCR test.  CT imaging of the head including and specifically the mastoid region, to rule out any otologic or sinus pathology, CT of the neck with IV contrast to rule out abscess or inflammatory or necrotic lymph node.  Based on laboratory testing.  Patient denies pregnancy stating she cannot get pregnant at this time.  She is currently not needing any pain medicine at this time.  There is no evidence of acute infection although discharge with amoxicillin may be warranted given the early effusion in the ear.  Disposition pending CT and laboratory studies.  This chart was made with dictation software and may contain typographical errors.

## 2025-03-04 NOTE — ED PROVIDER NOTE - NSFOLLOWUPINSTRUCTIONS_ED_ALL_ED_FT
take Ibuprofen for pain  take antibiotics as directed  follow up with ent  return to er any worsening symptoms     Lymphadenopathy  Body outline showing the lymphatic system.  Lymphadenopathy is when your lymph glands are swollen or larger than normal.    Lymph glands, also called lymph nodes, are clumps of tissue. They filter germs and waste from tissues in your body to your bloodstream. They're part of your body's defense system, or immune system.    Lymphadenopathy has different causes, like infection, autoimmune disease, and cancer. Lymphadenopathy can happen wherever you have lymph nodes. The type you have depends on which nodes it's in, such as:  Cervical lymphadenopathy. This is in the neck.  Mediastinal lymphadenopathy. This is in the chest.  Hilar lymphadenopathy. This is in the lungs.  Axillary lymphadenopathy. This is in the armpits.  Inguinal lymphadenopathy. This is in the groin.  Sometimes, fluid and cells that fight infection build up in your lymph nodes. This happens when your immune system reacts to germs or other substances that get into your body. This makes lymph nodes swell and get bigger.    Treatment is based on what's thought to be the cause. Sometimes, lymph nodes don't go back to normal size after treatment. If yours don't, your health care provider may order tests to help learn why your glands are still swollen and big.    Follow these instructions at home:  A heating pad for use on the swollen area.  Take over-the-counter and prescription medicines only as told by your provider.  If you were prescribed antibiotics, do not stop using them, even if you start to feel better.  If told, apply heat to swollen lymph nodes as told by your provider. Use the heat source that your provider recommends, such as a moist heat pack or a heating pad.  Place a towel between your skin and the heat source.  Leave the heat on only for the time told by your provider to avoid injury.  If your skin turns bright red, remove the heat right away to prevent burns. The risk of burns is higher if you cannot feel pain, heat, or cold.  Check your swollen lymph nodes every day for changes. Check other places where you have lymph nodes as told. Check for changes such as:  More swelling.  Sudden growth in size.  Redness or pain.  Hardness.  Contact a health care provider if:  You have lymph nodes that:  Are still swollen after 2 weeks.  Have gotten bigger all of a sudden or the swelling spreads.  Are red, painful, or hard.  Fluid leaks from the skin near a swollen lymph node.  You get a fever, chills, or night sweats.  You feel tired.  You have a sore throat.  Your abdomen hurts.  You lose weight without trying.  This information is not intended to replace advice given to you by your health care provider. Make sure you discuss any questions you have with your health care provider.

## 2025-03-04 NOTE — ED PROVIDER NOTE - OBJECTIVE STATEMENT
Patient is a 44-year-old otherwise healthy female coming complaining of pain behind her left ear for about 1 week.  Patient states she had an upper respiratory infection which got better was having fever headaches and chills but continues to have pain behind her left ear so went to urgent care advised to come to ED to rule out mastoiditis.  Patient states her fever has resolved she no longer has ear pain.  Patient is been taking Tylenol and Motrin for the pain.

## 2025-03-04 NOTE — ED ADULT TRIAGE NOTE - CHIEF COMPLAINT QUOTE
c/o pain behind L ear. Pt did go to  where she was told she needed a CT scan and possible ABT for Mastoiditis. no medication given at  or taken prior to arrival. no medicine allergies. Med hx prediabetic.

## 2025-03-10 ENCOUNTER — EMERGENCY (EMERGENCY)
Facility: HOSPITAL | Age: 45
LOS: 1 days | Discharge: HOME | End: 2025-03-10
Attending: STUDENT IN AN ORGANIZED HEALTH CARE EDUCATION/TRAINING PROGRAM | Admitting: STUDENT IN AN ORGANIZED HEALTH CARE EDUCATION/TRAINING PROGRAM
Payer: COMMERCIAL

## 2025-03-10 VITALS
SYSTOLIC BLOOD PRESSURE: 135 MMHG | HEART RATE: 74 BPM | RESPIRATION RATE: 16 BRPM | HEIGHT: 62 IN | WEIGHT: 162.92 LBS | OXYGEN SATURATION: 98 % | TEMPERATURE: 98 F | DIASTOLIC BLOOD PRESSURE: 85 MMHG

## 2025-03-10 VITALS
SYSTOLIC BLOOD PRESSURE: 138 MMHG | OXYGEN SATURATION: 97 % | DIASTOLIC BLOOD PRESSURE: 89 MMHG | RESPIRATION RATE: 18 BRPM | HEART RATE: 67 BPM

## 2025-03-10 DIAGNOSIS — Z98.890 OTHER SPECIFIED POSTPROCEDURAL STATES: Chronic | ICD-10-CM

## 2025-03-10 LAB — HCG UR QL: NEGATIVE — SIGNIFICANT CHANGE UP

## 2025-03-10 PROCEDURE — 81025 URINE PREGNANCY TEST: CPT

## 2025-03-10 PROCEDURE — 70486 CT MAXILLOFACIAL W/O DYE: CPT | Mod: MC

## 2025-03-10 PROCEDURE — 96374 THER/PROPH/DIAG INJ IV PUSH: CPT

## 2025-03-10 PROCEDURE — 70486 CT MAXILLOFACIAL W/O DYE: CPT | Mod: 26

## 2025-03-10 PROCEDURE — 99284 EMERGENCY DEPT VISIT MOD MDM: CPT

## 2025-03-10 PROCEDURE — 99284 EMERGENCY DEPT VISIT MOD MDM: CPT | Mod: 25

## 2025-03-10 RX ORDER — KETOROLAC TROMETHAMINE 30 MG/ML
15 INJECTION, SOLUTION INTRAMUSCULAR; INTRAVENOUS ONCE
Refills: 0 | Status: COMPLETED | OUTPATIENT
Start: 2025-03-10 | End: 2025-03-10

## 2025-03-10 RX ORDER — KETOROLAC TROMETHAMINE 30 MG/ML
15 INJECTION, SOLUTION INTRAMUSCULAR; INTRAVENOUS ONCE
Refills: 0 | Status: DISCONTINUED | OUTPATIENT
Start: 2025-03-10 | End: 2025-03-10

## 2025-03-10 RX ADMIN — KETOROLAC TROMETHAMINE 15 MILLIGRAM(S): 30 INJECTION, SOLUTION INTRAMUSCULAR; INTRAVENOUS at 20:25

## 2025-03-10 NOTE — ED ADULT NURSE NOTE - NS ED NOTE ABUSE RESPONSE YN
Koki Davies [place] [address] [facility] [place]. [address] [facility] [address] [address] 
[phone] Patient: Hemalatha Valles MRN: [id number] KCF:[id number] Visit Information Date & Time Provider Department Dept. Phone Encounter #  
 7/24/2018  4:30 PM Nikkie [place]bourne, [address] [place] [address] [phone] [id number] Follow-up Instructions Return in about 6 months (around 1/24/2019) for 30 minute slot. Follow-up and Disposition History Upcoming Health Maintenance Date Due Influenza Age 5 to Adult 8/1/2018 DTaP/Tdap/Td series (2 - Td) 1/15/2028 Allergies as of 7/24/2018  Review Complete On: 7/24/2018 By: Arash Alfred No Known Allergies Current Immunizations  Never Reviewed No immunizations on file. Not reviewed this visit You Were Diagnosed With   
  
 Codes Comments Elevated blood pressure reading    -  Primary ICD-10-CM: R03.0 ICD-9-CM: 796.2 Prediabetes     ICD-10-CM: R73.03 
ICD-9-CM: 790.29   
 BRIANNA (obstructive sleep apnea)     ICD-10-CM: G47.33 
ICD-9-CM: 327.23 Obesity (BMI 30-39. 9)     ICD-10-CM: E66.9 ICD-9-CM: 278.00 Vitals BP Pulse Temp Resp Height(growth percentile) Weight(growth percentile) (!) 138/93 (!) 59 97.9 °F (36.6 °C) (Oral) 18 5' 6\" (1.676 m) 216 lb (98 kg) BMI Smoking Status 34.86 kg/m2 Never Smoker Vitals History BMI and BSA Data Body Mass Index Body Surface Area 34.86 kg/m 2 2.14 m 2 Preferred Pharmacy Pharmacy Name Phone [facility] [address]  [facility] [phone] Your Updated Medication List  
  
Notice  As of 7/24/2018  5:02 PM  
 You have not been prescribed any medications. We Performed the Following REFERRAL TO WEIGHT LOSS [PSU157 Custom] Follow-up Instructions Return in about 6 months (around 1/24/2019) for 30 minute slot. Referral Information Referral ID Referred By Referred To  
  
 [id number] FRANK Castro Not Available Visits Status Start Date End Date 1 New Request 7/24/18 7/24/19 If your referral has a status of pending review or denied, additional information will be sent to support the outcome of this decision. Patient Instructions Body Mass Index: Care Instructions Your Care Instructions Body mass index (BMI) can help you see if your weight is raising your risk for health problems. It uses a formula to compare how much you weigh with how tall you are. · A BMI lower than 18.5 is considered underweight. · A BMI between 18.5 and 24.9 is considered healthy. · A BMI between 25 and 29.9 is considered overweight. A BMI of 30 or higher is considered obese. If your BMI is in the normal range, it means that you have a lower risk for weight-related health problems. If your BMI is in the overweight or obese range, you may be at increased risk for weight-related health problems, such as high blood pressure, heart disease, stroke, arthritis or joint pain, and diabetes. If your BMI is in the underweight range, you may be at increased risk for health problems such as fatigue, lower protection (immunity) against illness, muscle loss, bone loss, hair loss, and hormone problems. BMI is just one measure of your risk for weight-related health problems. You may be at higher risk for health problems if you are not active, you eat an unhealthy diet, or you drink too much alcohol or use tobacco products. Follow-up care is a key part of your treatment and safety. Be sure to make and go to all appointments, and call your doctor if you are having problems. It's also a good idea to know your test results and keep a list of the medicines you take. How can you care for yourself at home? · Practice healthy eating habits. This includes eating plenty of fruits, vegetables, whole grains, lean protein, and low-fat dairy. · If your doctor recommends it, get more exercise. Walking is a good choice. Bit by bit, increase the amount you walk every day. Try for at least 30 minutes on most days of the week. · Do not smoke. Smoking can increase your risk for health problems. If you need help quitting, talk to your doctor about stop-smoking programs and medicines. These can increase your chances of quitting for good. · Limit alcohol to 2 drinks a day for men and 1 drink a day for women. Too much alcohol can cause health problems. If you have a BMI higher than 25 · Your doctor may do other tests to check your risk for weight-related health problems. This may include measuring the distance around your waist. A waist measurement of more than 40 inches in men or 35 inches in women can increase the risk of weight-related health problems. · Talk with your doctor about steps you can take to stay healthy or improve your health. You may need to make lifestyle changes to lose weight and stay healthy, such as changing your diet and getting regular exercise. If you have a BMI lower than 18.5 · Your doctor may do other tests to check your risk for health problems. · Talk with your doctor about steps you can take to stay healthy or improve your health. You may need to make lifestyle changes to gain or maintain weight and stay healthy, such as getting more healthy foods in your diet and doing exercises to build muscle. Where can you learn more? Go to http://sheldon-brandyn.info/. Enter S176 in the search box to learn more about \"Body Mass Index: Care Instructions. \" Current as of: October 9, 2017 Content Version: 11.7 © 4207-4963 Healthwise, Incorporated. Care instructions adapted under license by [employer] (which disclaims liability or warranty for this information).  If you have questions about a medical condition or this instruction, always ask your healthcare professional. [employer] disclaims any warranty or liability for your use of this information. DASH Diet: Care Instructions Your Care Instructions The DASH diet is an eating plan that can help lower your blood pressure. DASH stands for Dietary Approaches to Stop Hypertension. Hypertension is high blood pressure. The DASH diet focuses on eating foods that are high in calcium, potassium, and magnesium. These nutrients can lower blood pressure. The foods that are highest in these nutrients are fruits, vegetables, low-fat dairy products, nuts, seeds, and legumes. But taking calcium, potassium, and magnesium supplements instead of eating foods that are high in those nutrients does not have the same effect. The DASH diet also includes whole grains, fish, and poultry. The DASH diet is one of several lifestyle changes your doctor may recommend to lower your high blood pressure. Your doctor may also want you to decrease the amount of sodium in your diet. Lowering sodium while following the DASH diet can lower blood pressure even further than just the DASH diet alone. Follow-up care is a key part of your treatment and safety. Be sure to make and go to all appointments, and call your doctor if you are having problems. It's also a good idea to know your test results and keep a list of the medicines you take. How can you care for yourself at home? Following the DASH diet · Eat 4 to 5 servings of fruit each day. A serving is 1 medium-sized piece of fruit, ½ cup chopped or canned fruit, 1/4 cup dried fruit, or 4 ounces (½ cup) of fruit juice. Choose fruit more often than fruit juice. · Eat 4 to 5 servings of vegetables each day. A serving is 1 cup of lettuce or raw leafy vegetables, ½ cup of chopped or cooked vegetables, or 4 ounces (½ cup) of vegetable juice. Choose vegetables more often than vegetable juice. · Get 2 to 3 servings of low-fat and fat-free dairy each day.  A serving is 8 ounces of milk, 1 cup of yogurt, or 1 ½ ounces of cheese. · Eat 6 to 8 servings of grains each day. A serving is 1 slice of bread, 1 ounce of dry cereal, or ½ cup of cooked rice, pasta, or cooked cereal. Try to choose whole-grain products as much as possible. · Limit lean meat, poultry, and fish to 2 servings each day. A serving is 3 ounces, about the size of a deck of cards. · Eat 4 to 5 servings of nuts, seeds, and legumes (cooked dried beans, lentils, and split peas) each week. A serving is 1/3 cup of nuts, 2 tablespoons of seeds, or ½ cup of cooked beans or peas. · Limit fats and oils to 2 to 3 servings each day. A serving is 1 teaspoon of vegetable oil or 2 tablespoons of salad dressing. · Limit sweets and added sugars to 5 servings or less a week. A serving is 1 tablespoon jelly or jam, ½ cup sorbet, or 1 cup of lemonade. · Eat less than 2,300 milligrams (mg) of sodium a day. If you limit your sodium to 1,500 mg a day, you can lower your blood pressure even more. Tips for success · Start small. Do not try to make dramatic changes to your diet all at once. You might feel that you are missing out on your favorite foods and then be more likely to not follow the plan. Make small changes, and stick with them. Once those changes become habit, add a few more changes. · Try some of the following: ¨ Make it a goal to eat a fruit or vegetable at every meal and at snacks. This will make it easy to get the recommended amount of fruits and vegetables each day. ¨ Try yogurt topped with fruit and nuts for a snack or healthy dessert. ¨ Add lettuce, tomato, cucumber, and onion to sandwiches. ¨ Combine a ready-made pizza crust with low-fat mozzarella cheese and lots of vegetable toppings. Try using tomatoes, squash, spinach, broccoli, carrots, cauliflower, and onions. ¨ Have a variety of cut-up vegetables with a low-fat dip as an appetizer instead of chips and dip. ¨ Sprinkle sunflower seeds or chopped almonds over salads. Or try adding chopped walnuts or almonds to cooked vegetables. ¨ Try some vegetarian meals using beans and peas. Add garbanzo or kidney beans to salads. Make burritos and tacos with mashed suggs beans or black beans. Where can you learn more? Go to http://sheldon-brandyn.info/. Enter J153 in the search box to learn more about \"DASH Diet: Care Instructions. \" Current as of: December 6, 2017 Content Version: 11.7 © 3318-7681 [email or web address].com. Care instructions adapted under license by [employer] (which disclaims liability or warranty for this information). If you have questions about a medical condition or this instruction, always ask your healthcare professional. [address] any warranty or liability for your use of this information. Patient Instructions History Introducing [facility]! Rosalia Valentine introduces Nitro PDF patient portal. Now you can access parts of your medical record, email your doctor's office, and request medication refills online. 1. In your internet browser, go to [email or web address] 2. Click on the First Time User? Click Here link in the Sign In box. You will see the New Member Sign Up page. 3. Enter your Nitro PDF Access Code exactly as it appears below. You will not need to use this code after youve completed the sign-up process. If you do not sign up before the expiration date, you must request a new code. · Nitro PDF Access Code: [email or web address] Expires: 10/22/2018  5:02 PM 
 
4. Enter the last four digits of your Social Security Number (xxxx) and Date of Birth (mm/dd/yyyy) as indicated and click Submit. You will be taken to the next sign-up page. 5. Create a Nitro PDF ID. This will be your Nitro PDF login ID and cannot be changed, so think of one that is secure and easy to remember. 6. Create a Fixes 4 Kids password. You can change your password at any time. 7. Enter your Password Reset Question and Answer. This can be used at a later time if you forget your password. 8. Enter your e-mail address. You will receive e-mail notification when new information is available in [address] 9. Click Sign Up. You can now view and download portions of your medical record. 10. Click the Download Summary menu link to download a portable copy of your medical information. If you have questions, please visit the Frequently Asked Questions section of the Fixes [employer] Kids website. Remember, Fixes 4 Kids is NOT to be used for urgent needs. For medical emergencies, dial 911. Now available from your iPhone and Android! Please provide this summary of care documentation to your next provider. Your primary care clinician is listed as PETEY Barreto. If you have any questions after today's visit, please call [phone]. Yes

## 2025-03-10 NOTE — ED PROVIDER NOTE - NSFOLLOWUPINSTRUCTIONS_ED_ALL_ED_FT
Log Out.  [employer]® CareNotes®  :  [facility]        FACIAL CONTUSION - AfterCare(R) Instructions(ER/ED)    Facial Contusion    WHAT YOU NEED TO KNOW:    A facial contusion is a bruise that appears on your face after an injury. A bruise happens when small blood vessels tear but skin does not. Blood leaks into nearby tissue, such as soft tissue or muscle.    DISCHARGE INSTRUCTIONS:    Return to the emergency department if:    You have a fever.    You have watery, clear fluid draining from your nose.    You have changes in your vision or eye appearance.    You have changes or pain with eye movement.    You have tingling or numbness in or near the injured area.  Call your doctor if:    You find a new lump in the injured area.    Your symptoms do not improve with treatment.    You have questions or concerns about your condition or care.  Medicines:    Medicine may be used to treat or prevent pain or swelling.    Take your medicine as directed. Contact your healthcare provider if you think your medicine is not helping or if you have side effects. Tell your provider if you are allergic to any medicine. Keep a list of the medicines, vitamins, and herbs you take. Include the amounts, and when and why you take them. Bring the list or the pill bottles to follow-up visits. Carry your medicine list with you in case of an emergency.  Help your facial contusion heal:    Rest the area around the bruise, or use it less than usual.    Apply ice to decrease swelling and pain. Ice may also help prevent tissue damage. Use an ice pack, or put crushed ice in a plastic bag. Cover the bag with a towel and place it on your bruise for 15 to 20 minutes every hour or as directed.    Elevate your head to help decrease pain and swelling. Use pillows, blankets, or rolled towels to keep your head elevated.  Elevate Head (Adult)      Do not massage the area or put heating pads on the bruise right after your injury. Heat and massage may slow healing. Your healthcare provider may tell you to apply heat after several days. At that time, heat will start to help the injury heal.  Prevent a facial contusion:    Use safety belts and child restraints.    Use safety helmets when you ride a bicycle or motorcycle.    Use a mouth and face guard during sports.  Follow up with your doctor as directed: You may need to return within a week to have your injury checked again. Write down any questions you have so you remember to ask them in your follow-up visits.    © [employer] US L.P. 1973, 2025    	  back to top            © [employer].P[employer] 1973, 2025

## 2025-03-10 NOTE — ED ADULT NURSE NOTE - NSFALLUNIVINTERV_ED_ALL_ED
Bed/Stretcher in lowest position, wheels locked, appropriate side rails in place/Call bell, personal items and telephone in reach/Instruct patient to call for assistance before getting out of bed/chair/stretcher/Non-slip footwear applied when patient is off stretcher/[place] to call system/Physically safe environment - no spills, clutter or unnecessary equipment/Purposeful proactive rounding/Room/bathroom lighting operational, light cord in reach

## 2025-03-10 NOTE — ED PROVIDER NOTE - PATIENT PORTAL LINK FT
You can access the FollowMyHealth Patient Portal offered by [facility] by registering at the following website: [email or web address]. By joining [employer]’s FollowMyHealth portal, you will also be able to view your health information using other applications (apps) compatible with our system.

## 2025-03-10 NOTE — ED ADULT NURSE NOTE - OBJECTIVE STATEMENT
44 yr old female arrives to ED c/o S/P hit in the head yesterday while playing. Now c/o jaw pain. no obvious deformity pt denies fever chills, chest pain or sob at this time. pt denies fever chills, chest pain or sob at this time. Priti MUNIZ

## 2025-03-10 NOTE — ED PROVIDER NOTE - CLINICAL SUMMARY MEDICAL DECISION MAKING FREE TEXT BOX
Carla ATTG   44-year-old female without any significant past medical conditions chief complaint of left-sided jaw injury.  Patient was playing volleyball had a elbow to the left side of the face no LOC reported.  Patient put ice upon it patient took some Motrin Tylenol this morning patient coming in given pain with pressing down on her teeth.    GENERAL: Awake, alert, NAD  HEENT Swelling to the left side of the face tenderness overlying the mandibular temporal joint  Able to open and close jaw  LUNGS:  non labored breathing   ABDOMEN: Soft, , non tender, non distended, no rebound, no guarding  BACK: No midline spinal tenderness, no CVA tenderness  EXT: No edema, no calf tenderness no deformities.  NEURO: A&Ox3. Moving all extremities.  SKIN: Warm and dry. No rash.  PSYCH: Normal affect.   given  History and physical exam plan for CT if negative discharge low concern for any acute fracture given mechanism
2.41

## 2025-04-25 ENCOUNTER — APPOINTMENT (OUTPATIENT)
Dept: HUMAN REPRODUCTION | Facility: CLINIC | Age: 45
End: 2025-04-25
Payer: COMMERCIAL

## 2025-04-25 PROCEDURE — 36415 COLL VENOUS BLD VENIPUNCTURE: CPT | Mod: NC

## 2025-04-25 PROCEDURE — 99213 OFFICE O/P EST LOW 20 MIN: CPT | Mod: NC

## 2025-04-25 PROCEDURE — 76830 TRANSVAGINAL US NON-OB: CPT | Mod: NC

## 2025-04-25 PROCEDURE — S4042: CPT | Mod: NC

## 2025-04-29 ENCOUNTER — APPOINTMENT (OUTPATIENT)
Dept: HUMAN REPRODUCTION | Facility: CLINIC | Age: 45
End: 2025-04-29
Payer: COMMERCIAL

## 2025-04-29 PROCEDURE — 99213 OFFICE O/P EST LOW 20 MIN: CPT | Mod: NC

## 2025-04-29 PROCEDURE — 36415 COLL VENOUS BLD VENIPUNCTURE: CPT | Mod: NC

## 2025-04-29 PROCEDURE — 76857 US EXAM PELVIC LIMITED: CPT | Mod: NC

## 2025-05-01 ENCOUNTER — APPOINTMENT (OUTPATIENT)
Dept: HUMAN REPRODUCTION | Facility: CLINIC | Age: 45
End: 2025-05-01
Payer: COMMERCIAL

## 2025-05-01 PROCEDURE — 99213 OFFICE O/P EST LOW 20 MIN: CPT | Mod: NC

## 2025-05-01 PROCEDURE — 76857 US EXAM PELVIC LIMITED: CPT | Mod: NC

## 2025-05-01 PROCEDURE — 36415 COLL VENOUS BLD VENIPUNCTURE: CPT | Mod: NC

## 2025-05-03 ENCOUNTER — APPOINTMENT (OUTPATIENT)
Dept: HUMAN REPRODUCTION | Facility: CLINIC | Age: 45
End: 2025-05-03
Payer: COMMERCIAL

## 2025-05-03 PROCEDURE — 36415 COLL VENOUS BLD VENIPUNCTURE: CPT | Mod: NC

## 2025-05-03 PROCEDURE — 76857 US EXAM PELVIC LIMITED: CPT | Mod: NC

## 2025-05-03 PROCEDURE — 99213 OFFICE O/P EST LOW 20 MIN: CPT | Mod: NC

## 2025-05-05 ENCOUNTER — APPOINTMENT (OUTPATIENT)
Dept: HUMAN REPRODUCTION | Facility: CLINIC | Age: 45
End: 2025-05-05
Payer: COMMERCIAL

## 2025-05-05 PROCEDURE — 36415 COLL VENOUS BLD VENIPUNCTURE: CPT | Mod: NC

## 2025-05-05 PROCEDURE — 76857 US EXAM PELVIC LIMITED: CPT | Mod: NC

## 2025-05-05 PROCEDURE — 99213 OFFICE O/P EST LOW 20 MIN: CPT | Mod: NC

## 2025-05-06 ENCOUNTER — APPOINTMENT (OUTPATIENT)
Dept: HUMAN REPRODUCTION | Facility: CLINIC | Age: 45
End: 2025-05-06
Payer: COMMERCIAL

## 2025-05-06 PROCEDURE — 76857 US EXAM PELVIC LIMITED: CPT | Mod: NC

## 2025-05-06 PROCEDURE — 99213 OFFICE O/P EST LOW 20 MIN: CPT | Mod: NC

## 2025-05-06 PROCEDURE — 36415 COLL VENOUS BLD VENIPUNCTURE: CPT | Mod: NC

## 2025-05-21 ENCOUNTER — APPOINTMENT (OUTPATIENT)
Dept: HUMAN REPRODUCTION | Facility: CLINIC | Age: 45
End: 2025-05-21
Payer: COMMERCIAL

## 2025-05-21 PROCEDURE — 36415 COLL VENOUS BLD VENIPUNCTURE: CPT | Mod: NC

## 2025-05-21 PROCEDURE — S4042: CPT | Mod: NC

## 2025-05-21 PROCEDURE — 76830 TRANSVAGINAL US NON-OB: CPT | Mod: NC

## 2025-05-21 PROCEDURE — 99213 OFFICE O/P EST LOW 20 MIN: CPT | Mod: NC

## 2025-05-27 ENCOUNTER — APPOINTMENT (OUTPATIENT)
Dept: HUMAN REPRODUCTION | Facility: CLINIC | Age: 45
End: 2025-05-27

## 2025-05-27 PROCEDURE — 76857 US EXAM PELVIC LIMITED: CPT | Mod: NC

## 2025-05-27 PROCEDURE — 99213 OFFICE O/P EST LOW 20 MIN: CPT | Mod: NC

## 2025-05-27 PROCEDURE — 36415 COLL VENOUS BLD VENIPUNCTURE: CPT | Mod: NC

## 2025-05-30 ENCOUNTER — APPOINTMENT (OUTPATIENT)
Dept: HUMAN REPRODUCTION | Facility: CLINIC | Age: 45
End: 2025-05-30

## 2025-05-30 PROCEDURE — 36415 COLL VENOUS BLD VENIPUNCTURE: CPT | Mod: NC

## 2025-05-30 PROCEDURE — 99213 OFFICE O/P EST LOW 20 MIN: CPT | Mod: NC

## 2025-05-30 PROCEDURE — 76857 US EXAM PELVIC LIMITED: CPT | Mod: NC

## 2025-06-06 ENCOUNTER — APPOINTMENT (OUTPATIENT)
Dept: HUMAN REPRODUCTION | Facility: CLINIC | Age: 45
End: 2025-06-06

## 2025-06-06 PROCEDURE — 99213 OFFICE O/P EST LOW 20 MIN: CPT | Mod: NC

## 2025-06-06 PROCEDURE — 36415 COLL VENOUS BLD VENIPUNCTURE: CPT | Mod: NC

## 2025-06-06 PROCEDURE — 76857 US EXAM PELVIC LIMITED: CPT | Mod: NC

## 2025-06-09 ENCOUNTER — APPOINTMENT (OUTPATIENT)
Dept: HUMAN REPRODUCTION | Facility: CLINIC | Age: 45
End: 2025-06-09
Payer: COMMERCIAL

## 2025-06-09 PROCEDURE — 99214 OFFICE O/P EST MOD 30 MIN: CPT | Mod: NC

## 2025-06-10 ENCOUNTER — APPOINTMENT (OUTPATIENT)
Dept: HUMAN REPRODUCTION | Facility: CLINIC | Age: 45
End: 2025-06-10
Payer: COMMERCIAL

## 2025-06-10 PROCEDURE — 36415 COLL VENOUS BLD VENIPUNCTURE: CPT | Mod: NC

## 2025-06-12 ENCOUNTER — APPOINTMENT (OUTPATIENT)
Dept: HUMAN REPRODUCTION | Facility: CLINIC | Age: 45
End: 2025-06-12
Payer: COMMERCIAL

## 2025-06-12 ENCOUNTER — APPOINTMENT (OUTPATIENT)
Dept: HUMAN REPRODUCTION | Facility: CLINIC | Age: 45
End: 2025-06-12

## 2025-06-12 PROCEDURE — 89352 THAWING CRYOPRESRVED EMBRYO: CPT | Mod: NC

## 2025-06-12 PROCEDURE — 89398P: CUSTOM

## 2025-06-12 PROCEDURE — S4037P: CUSTOM

## 2025-06-12 PROCEDURE — 76998 US GUIDE INTRAOP: CPT | Mod: NC

## 2025-06-13 ENCOUNTER — APPOINTMENT (OUTPATIENT)
Dept: HUMAN REPRODUCTION | Facility: CLINIC | Age: 45
End: 2025-06-13
Payer: COMMERCIAL

## 2025-06-23 ENCOUNTER — APPOINTMENT (OUTPATIENT)
Dept: HUMAN REPRODUCTION | Facility: CLINIC | Age: 45
End: 2025-06-23
Payer: COMMERCIAL

## 2025-06-23 PROCEDURE — 36415 COLL VENOUS BLD VENIPUNCTURE: CPT | Mod: NC

## 2025-08-01 ENCOUNTER — APPOINTMENT (OUTPATIENT)
Dept: OBGYN | Facility: CLINIC | Age: 45
End: 2025-08-01

## 2025-08-03 ENCOUNTER — EMERGENCY (EMERGENCY)
Facility: HOSPITAL | Age: 45
LOS: 1 days | End: 2025-08-03
Attending: EMERGENCY MEDICINE | Admitting: EMERGENCY MEDICINE
Payer: COMMERCIAL

## 2025-08-03 VITALS
TEMPERATURE: 98 F | OXYGEN SATURATION: 97 % | HEART RATE: 69 BPM | DIASTOLIC BLOOD PRESSURE: 90 MMHG | RESPIRATION RATE: 16 BRPM | SYSTOLIC BLOOD PRESSURE: 160 MMHG

## 2025-08-03 VITALS
SYSTOLIC BLOOD PRESSURE: 159 MMHG | OXYGEN SATURATION: 98 % | TEMPERATURE: 98 F | HEART RATE: 67 BPM | DIASTOLIC BLOOD PRESSURE: 94 MMHG | RESPIRATION RATE: 18 BRPM

## 2025-08-03 DIAGNOSIS — Z98.890 OTHER SPECIFIED POSTPROCEDURAL STATES: Chronic | ICD-10-CM

## 2025-08-03 PROCEDURE — 99283 EMERGENCY DEPT VISIT LOW MDM: CPT

## 2025-08-03 PROCEDURE — 99284 EMERGENCY DEPT VISIT MOD MDM: CPT

## 2025-08-03 RX ORDER — CEPHALEXIN 250 MG/1
500 CAPSULE ORAL ONCE
Refills: 0 | Status: COMPLETED | OUTPATIENT
Start: 2025-08-03 | End: 2025-08-03

## 2025-08-03 RX ORDER — CEFADROXIL 500 MG/1
1 CAPSULE ORAL
Qty: 14 | Refills: 0
Start: 2025-08-03 | End: 2025-08-09

## 2025-08-03 RX ADMIN — CEPHALEXIN 500 MILLIGRAM(S): 250 CAPSULE ORAL at 20:16

## 2025-08-06 ENCOUNTER — OUTPATIENT (OUTPATIENT)
Dept: OUTPATIENT SERVICES | Facility: HOSPITAL | Age: 45
LOS: 1 days | End: 2025-08-06

## 2025-08-06 DIAGNOSIS — Z98.890 OTHER SPECIFIED POSTPROCEDURAL STATES: Chronic | ICD-10-CM

## 2025-08-06 DIAGNOSIS — Z00.8 ENCOUNTER FOR OTHER GENERAL EXAMINATION: ICD-10-CM

## 2025-09-06 ENCOUNTER — RESULT REVIEW (OUTPATIENT)
Age: 45
End: 2025-09-06

## 2025-09-06 ENCOUNTER — OUTPATIENT (OUTPATIENT)
Dept: OUTPATIENT SERVICES | Facility: HOSPITAL | Age: 45
LOS: 1 days | End: 2025-09-06
Payer: COMMERCIAL

## 2025-09-06 ENCOUNTER — APPOINTMENT (OUTPATIENT)
Dept: MRI IMAGING | Facility: CLINIC | Age: 45
End: 2025-09-06

## 2025-09-06 DIAGNOSIS — Z00.8 ENCOUNTER FOR OTHER GENERAL EXAMINATION: ICD-10-CM

## 2025-09-06 DIAGNOSIS — Z98.890 OTHER SPECIFIED POSTPROCEDURAL STATES: Chronic | ICD-10-CM

## 2025-09-06 PROCEDURE — A9585: CPT

## 2025-09-06 PROCEDURE — 72197 MRI PELVIS W/O & W/DYE: CPT | Mod: 26

## 2025-09-06 PROCEDURE — 72197 MRI PELVIS W/O & W/DYE: CPT

## 2025-09-10 ENCOUNTER — APPOINTMENT (OUTPATIENT)
Dept: OBGYN | Facility: CLINIC | Age: 45
End: 2025-09-10
Payer: COMMERCIAL

## 2025-09-10 VITALS
WEIGHT: 163 LBS | DIASTOLIC BLOOD PRESSURE: 113 MMHG | HEIGHT: 62 IN | BODY MASS INDEX: 30 KG/M2 | SYSTOLIC BLOOD PRESSURE: 153 MMHG

## 2025-09-10 PROCEDURE — 99203 OFFICE O/P NEW LOW 30 MIN: CPT

## (undated) DEVICE — SOCK SPECIMEN 3/8"-1/2" MALE PORT

## (undated) DEVICE — VACUUM CURETTE BERKLEY OLYMPUS CURVED 8MM

## (undated) DEVICE — VACUUM CURETTE BUSSE HOSP CURVED 10MM

## (undated) DEVICE — PACK LITHOTOMY

## (undated) DEVICE — POSITIONER FOAM EGG CRATE ULNAR 2PCS (PINK)

## (undated) DEVICE — WARMING BLANKET UPPER ADULT

## (undated) DEVICE — GLV 7.5 PROTEXIS (WHITE)

## (undated) DEVICE — SOL IRR POUR NS 0.9% 500ML

## (undated) DEVICE — VACUUM CURETTE BERKLEY OLYMPUS CURVED 12MM

## (undated) DEVICE — DRAPE 1/2 SHEET 40X57"

## (undated) DEVICE — VACUUM CURETTE BERKLEY OLYMPUS CURVED 9MM

## (undated) DEVICE — POSITIONER PATIENT SAFETY STRAP 3X60"

## (undated) DEVICE — VACUUM CURETTE BERKLEY OLYMPUS F TIP 6MM

## (undated) DEVICE — VACUUM CURETTE BERKLEY OLYMPUS CURVED 11MM

## (undated) DEVICE — VACUUM CURETTE BUSSE HOSP CURVED 14MM

## (undated) DEVICE — VACUUM CURETTE BERKLEY OLYMPUS CURVED 7MM

## (undated) DEVICE — VACUUM CURETTE BUSSE HOSP CURVED 11MM

## (undated) DEVICE — TUBING UTERINE ASPIRATION 3/8" X 6FT W/O ADAPTER

## (undated) DEVICE — VACUUM CURETTE MEDGYN CURVED 8MM

## (undated) DEVICE — VACUUM CURETTE BERKLEY OLYMPUS CURVED 16MM X 1/2"

## (undated) DEVICE — VACUUM CURETTE MEDGYN CURVED 13MM

## (undated) DEVICE — VACUUM CURETTE BUSSE HOSP CURVED 12MM

## (undated) DEVICE — DRAPE LIGHT HANDLE COVER (GREEN)

## (undated) DEVICE — VACUUM CURETTE BUSSE HOSP STRAIGHT 7MM

## (undated) DEVICE — VACUUM CURETTE BUSSE HOSP CURVED 9MM